# Patient Record
Sex: MALE | Race: WHITE | NOT HISPANIC OR LATINO | Employment: OTHER | URBAN - METROPOLITAN AREA
[De-identification: names, ages, dates, MRNs, and addresses within clinical notes are randomized per-mention and may not be internally consistent; named-entity substitution may affect disease eponyms.]

---

## 2017-04-05 ENCOUNTER — APPOINTMENT (EMERGENCY)
Dept: RADIOLOGY | Facility: HOSPITAL | Age: 82
DRG: 698 | End: 2017-04-05
Payer: MEDICARE

## 2017-04-05 ENCOUNTER — HOSPITAL ENCOUNTER (INPATIENT)
Facility: HOSPITAL | Age: 82
LOS: 6 days | Discharge: NURSING, REHAB OR LONG-TERM CARE | DRG: 698 | End: 2017-04-11
Attending: ANESTHESIOLOGY | Admitting: ANESTHESIOLOGY
Payer: MEDICARE

## 2017-04-05 DIAGNOSIS — A41.9 SEPSIS DUE TO URINARY TRACT INFECTION (HCC): ICD-10-CM

## 2017-04-05 DIAGNOSIS — L89.90 DECUBITUS ULCERS: ICD-10-CM

## 2017-04-05 DIAGNOSIS — A41.9 SEPSIS, UNSPECIFIED: ICD-10-CM

## 2017-04-05 DIAGNOSIS — N39.0 URINARY TRACT INFECTION ASSOCIATED WITH INDWELLING URETHRAL CATHETER (HCC): ICD-10-CM

## 2017-04-05 DIAGNOSIS — Z16.12 ESBL (EXTENDED SPECTRUM BETA-LACTAMASE) PRODUCING BACTERIA INFECTION: ICD-10-CM

## 2017-04-05 DIAGNOSIS — N39.0 SEPSIS DUE TO URINARY TRACT INFECTION (HCC): ICD-10-CM

## 2017-04-05 DIAGNOSIS — A49.9 ESBL (EXTENDED SPECTRUM BETA-LACTAMASE) PRODUCING BACTERIA INFECTION: ICD-10-CM

## 2017-04-05 DIAGNOSIS — N39.0 UTI (URINARY TRACT INFECTION): Primary | ICD-10-CM

## 2017-04-05 DIAGNOSIS — N39.0 URINARY TRACT INFECTION ASSOCIATED WITH NEPHROSTOMY CATHETER, INITIAL ENCOUNTER (HCC): ICD-10-CM

## 2017-04-05 DIAGNOSIS — T83.512A URINARY TRACT INFECTION ASSOCIATED WITH NEPHROSTOMY CATHETER, INITIAL ENCOUNTER (HCC): ICD-10-CM

## 2017-04-05 DIAGNOSIS — R78.81 BACTEREMIA DUE TO GRAM-NEGATIVE BACTERIA: ICD-10-CM

## 2017-04-05 DIAGNOSIS — T83.511A URINARY TRACT INFECTION ASSOCIATED WITH INDWELLING URETHRAL CATHETER (HCC): ICD-10-CM

## 2017-04-05 PROBLEM — L89.322 DECUBITUS ULCER OF LEFT BUTTOCK, STAGE 2 (HCC): Status: ACTIVE | Noted: 2017-04-05

## 2017-04-05 PROBLEM — L89.309 DECUBITUS ULCER OF BUTTOCK: Status: ACTIVE | Noted: 2017-04-05

## 2017-04-05 PROBLEM — L89.600: Status: ACTIVE | Noted: 2017-04-05

## 2017-04-05 PROBLEM — R13.10 DYSPHAGIA: Status: ACTIVE | Noted: 2017-04-05

## 2017-04-05 PROBLEM — I10 HTN (HYPERTENSION): Status: ACTIVE | Noted: 2017-04-05

## 2017-04-05 PROBLEM — E87.2 LACTIC ACIDOSIS: Status: ACTIVE | Noted: 2017-04-05

## 2017-04-05 PROBLEM — L89.620 DECUBITUS ULCER OF LEFT HEEL, UNSTAGEABLE (HCC): Status: ACTIVE | Noted: 2017-04-05

## 2017-04-05 PROBLEM — E11.9 DM (DIABETES MELLITUS) (HCC): Status: ACTIVE | Noted: 2017-04-05

## 2017-04-05 LAB
ALBUMIN SERPL BCP-MCNC: 2.9 G/DL (ref 3.5–5)
ALP SERPL-CCNC: 72 U/L (ref 46–116)
ALT SERPL W P-5'-P-CCNC: 14 U/L (ref 12–78)
ANION GAP SERPL CALCULATED.3IONS-SCNC: 7 MMOL/L (ref 4–13)
AST SERPL W P-5'-P-CCNC: 9 U/L (ref 5–45)
ATRIAL RATE: 63 BPM
BACTERIA UR QL AUTO: ABNORMAL /HPF
BACTERIA UR QL AUTO: ABNORMAL /HPF
BASOPHILS # BLD AUTO: 0.08 THOUSANDS/ΜL (ref 0–0.1)
BASOPHILS NFR BLD AUTO: 0 % (ref 0–1)
BILIRUB SERPL-MCNC: 0.6 MG/DL (ref 0.2–1)
BILIRUB UR QL STRIP: NEGATIVE
BILIRUB UR QL STRIP: NEGATIVE
BUN SERPL-MCNC: 13 MG/DL (ref 5–25)
CALCIUM SERPL-MCNC: 9.7 MG/DL (ref 8.3–10.1)
CHLORIDE SERPL-SCNC: 101 MMOL/L (ref 100–108)
CLARITY UR: ABNORMAL
CLARITY UR: ABNORMAL
CO2 SERPL-SCNC: 27 MMOL/L (ref 21–32)
COLOR UR: YELLOW
COLOR UR: YELLOW
CREAT SERPL-MCNC: 1.01 MG/DL (ref 0.6–1.3)
EOSINOPHIL # BLD AUTO: 0.01 THOUSAND/ΜL (ref 0–0.61)
EOSINOPHIL NFR BLD AUTO: 0 % (ref 0–6)
ERYTHROCYTE [DISTWIDTH] IN BLOOD BY AUTOMATED COUNT: 13.7 % (ref 11.6–15.1)
GFR SERPL CREATININE-BSD FRML MDRD: >60 ML/MIN/1.73SQ M
GLUCOSE SERPL-MCNC: 150 MG/DL (ref 65–140)
GLUCOSE SERPL-MCNC: 179 MG/DL (ref 65–140)
GLUCOSE SERPL-MCNC: 62 MG/DL (ref 65–140)
GLUCOSE UR STRIP-MCNC: NEGATIVE MG/DL
GLUCOSE UR STRIP-MCNC: NEGATIVE MG/DL
HCT VFR BLD AUTO: 43.5 % (ref 36.5–49.3)
HGB BLD-MCNC: 13.9 G/DL (ref 12–17)
HGB UR QL STRIP.AUTO: ABNORMAL
HGB UR QL STRIP.AUTO: ABNORMAL
KETONES UR STRIP-MCNC: NEGATIVE MG/DL
KETONES UR STRIP-MCNC: NEGATIVE MG/DL
LACTATE SERPL-SCNC: 1.9 MMOL/L (ref 0.5–2)
LACTATE SERPL-SCNC: 2.7 MMOL/L (ref 0.5–2)
LEUKOCYTE ESTERASE UR QL STRIP: ABNORMAL
LEUKOCYTE ESTERASE UR QL STRIP: ABNORMAL
LIPASE SERPL-CCNC: 115 U/L (ref 73–393)
LYMPHOCYTES # BLD AUTO: 2.17 THOUSANDS/ΜL (ref 0.6–4.47)
LYMPHOCYTES NFR BLD AUTO: 9 % (ref 14–44)
MCH RBC QN AUTO: 28.7 PG (ref 26.8–34.3)
MCHC RBC AUTO-ENTMCNC: 32 G/DL (ref 31.4–37.4)
MCV RBC AUTO: 90 FL (ref 82–98)
MONOCYTES # BLD AUTO: 2.08 THOUSAND/ΜL (ref 0.17–1.22)
MONOCYTES NFR BLD AUTO: 9 % (ref 4–12)
NEUTROPHILS # BLD AUTO: 19.96 THOUSANDS/ΜL (ref 1.85–7.62)
NEUTS SEG NFR BLD AUTO: 82 % (ref 43–75)
NITRITE UR QL STRIP: NEGATIVE
NITRITE UR QL STRIP: POSITIVE
NON-SQ EPI CELLS URNS QL MICRO: ABNORMAL /HPF
NON-SQ EPI CELLS URNS QL MICRO: ABNORMAL /HPF
NRBC BLD AUTO-RTO: 0 /100 WBCS
NT-PROBNP SERPL-MCNC: 657 PG/ML
P AXIS: -2 DEGREES
PH UR STRIP.AUTO: >=9 [PH] (ref 4.5–8)
PH UR STRIP.AUTO: >=9 [PH] (ref 4.5–8)
PLATELET # BLD AUTO: 358 THOUSANDS/UL (ref 149–390)
PMV BLD AUTO: 10.2 FL (ref 8.9–12.7)
POTASSIUM SERPL-SCNC: 4.3 MMOL/L (ref 3.5–5.3)
PR INTERVAL: 216 MS
PROT SERPL-MCNC: 7.8 G/DL (ref 6.4–8.2)
PROT UR STRIP-MCNC: ABNORMAL MG/DL
PROT UR STRIP-MCNC: ABNORMAL MG/DL
QRS AXIS: -36 DEGREES
QRSD INTERVAL: 98 MS
QT INTERVAL: 392 MS
QTC INTERVAL: 401 MS
RBC # BLD AUTO: 4.84 MILLION/UL (ref 3.88–5.62)
RBC #/AREA URNS AUTO: ABNORMAL /HPF
RBC #/AREA URNS AUTO: ABNORMAL /HPF
SODIUM SERPL-SCNC: 135 MMOL/L (ref 136–145)
SP GR UR STRIP.AUTO: <=1.005 (ref 1–1.03)
SP GR UR STRIP.AUTO: <=1.005 (ref 1–1.03)
T WAVE AXIS: 70 DEGREES
TROPONIN I SERPL-MCNC: <0.02 NG/ML
TROPONIN I SERPL-MCNC: <0.02 NG/ML
UROBILINOGEN UR QL STRIP.AUTO: 1 E.U./DL
UROBILINOGEN UR QL STRIP.AUTO: 1 E.U./DL
VENTRICULAR RATE: 63 BPM
WBC # BLD AUTO: 24.5 THOUSAND/UL (ref 4.31–10.16)
WBC #/AREA URNS AUTO: ABNORMAL /HPF
WBC #/AREA URNS AUTO: ABNORMAL /HPF

## 2017-04-05 PROCEDURE — 80053 COMPREHEN METABOLIC PANEL: CPT | Performed by: PHYSICIAN ASSISTANT

## 2017-04-05 PROCEDURE — 87077 CULTURE AEROBIC IDENTIFY: CPT | Performed by: ANESTHESIOLOGY

## 2017-04-05 PROCEDURE — 71020 HB CHEST X-RAY 2VW FRONTAL&LATL: CPT

## 2017-04-05 PROCEDURE — 83605 ASSAY OF LACTIC ACID: CPT | Performed by: ANESTHESIOLOGY

## 2017-04-05 PROCEDURE — 93005 ELECTROCARDIOGRAM TRACING: CPT | Performed by: PHYSICIAN ASSISTANT

## 2017-04-05 PROCEDURE — 87040 BLOOD CULTURE FOR BACTERIA: CPT | Performed by: PHYSICIAN ASSISTANT

## 2017-04-05 PROCEDURE — 83690 ASSAY OF LIPASE: CPT | Performed by: PHYSICIAN ASSISTANT

## 2017-04-05 PROCEDURE — 83880 ASSAY OF NATRIURETIC PEPTIDE: CPT | Performed by: PHYSICIAN ASSISTANT

## 2017-04-05 PROCEDURE — 84484 ASSAY OF TROPONIN QUANT: CPT | Performed by: NURSE PRACTITIONER

## 2017-04-05 PROCEDURE — 99285 EMERGENCY DEPT VISIT HI MDM: CPT

## 2017-04-05 PROCEDURE — 96361 HYDRATE IV INFUSION ADD-ON: CPT

## 2017-04-05 PROCEDURE — 93005 ELECTROCARDIOGRAM TRACING: CPT

## 2017-04-05 PROCEDURE — 87077 CULTURE AEROBIC IDENTIFY: CPT | Performed by: PHYSICIAN ASSISTANT

## 2017-04-05 PROCEDURE — 83605 ASSAY OF LACTIC ACID: CPT | Performed by: PHYSICIAN ASSISTANT

## 2017-04-05 PROCEDURE — 87186 SC STD MICRODIL/AGAR DIL: CPT | Performed by: PHYSICIAN ASSISTANT

## 2017-04-05 PROCEDURE — 87086 URINE CULTURE/COLONY COUNT: CPT

## 2017-04-05 PROCEDURE — 82948 REAGENT STRIP/BLOOD GLUCOSE: CPT

## 2017-04-05 PROCEDURE — 36415 COLL VENOUS BLD VENIPUNCTURE: CPT | Performed by: PHYSICIAN ASSISTANT

## 2017-04-05 PROCEDURE — 81001 URINALYSIS AUTO W/SCOPE: CPT

## 2017-04-05 PROCEDURE — 87086 URINE CULTURE/COLONY COUNT: CPT | Performed by: PHYSICIAN ASSISTANT

## 2017-04-05 PROCEDURE — 81001 URINALYSIS AUTO W/SCOPE: CPT | Performed by: PHYSICIAN ASSISTANT

## 2017-04-05 PROCEDURE — 85025 COMPLETE CBC W/AUTO DIFF WBC: CPT | Performed by: PHYSICIAN ASSISTANT

## 2017-04-05 PROCEDURE — 84484 ASSAY OF TROPONIN QUANT: CPT | Performed by: PHYSICIAN ASSISTANT

## 2017-04-05 PROCEDURE — 94760 N-INVAS EAR/PLS OXIMETRY 1: CPT

## 2017-04-05 PROCEDURE — 96374 THER/PROPH/DIAG INJ IV PUSH: CPT

## 2017-04-05 RX ORDER — FUROSEMIDE 40 MG/1
40 TABLET ORAL
COMMUNITY

## 2017-04-05 RX ORDER — HEPARIN SODIUM 5000 [USP'U]/ML
5000 INJECTION, SOLUTION INTRAVENOUS; SUBCUTANEOUS EVERY 8 HOURS SCHEDULED
Status: DISCONTINUED | OUTPATIENT
Start: 2017-04-05 | End: 2017-04-11 | Stop reason: HOSPADM

## 2017-04-05 RX ORDER — METOPROLOL TARTRATE 50 MG/1
50 TABLET, FILM COATED ORAL
COMMUNITY

## 2017-04-05 RX ORDER — SODIUM CHLORIDE 9 MG/ML
125 INJECTION, SOLUTION INTRAVENOUS CONTINUOUS
Status: DISCONTINUED | OUTPATIENT
Start: 2017-04-05 | End: 2017-04-06

## 2017-04-05 RX ORDER — SODIUM CHLORIDE 9 MG/ML
125 INJECTION, SOLUTION INTRAVENOUS CONTINUOUS
Status: DISCONTINUED | OUTPATIENT
Start: 2017-04-05 | End: 2017-04-05 | Stop reason: SDUPTHER

## 2017-04-05 RX ORDER — TAMSULOSIN HYDROCHLORIDE 0.4 MG/1
0.4 CAPSULE ORAL
COMMUNITY

## 2017-04-05 RX ORDER — ACETAMINOPHEN 325 MG/1
650 TABLET ORAL ONCE
Status: DISCONTINUED | OUTPATIENT
Start: 2017-04-05 | End: 2017-04-11 | Stop reason: HOSPADM

## 2017-04-05 RX ORDER — PANTOPRAZOLE SODIUM 40 MG/1
40 INJECTION, POWDER, FOR SOLUTION INTRAVENOUS
Status: DISCONTINUED | OUTPATIENT
Start: 2017-04-06 | End: 2017-04-11 | Stop reason: HOSPADM

## 2017-04-05 RX ORDER — DILTIAZEM HYDROCHLORIDE 120 MG/1
120 TABLET, FILM COATED ORAL
COMMUNITY
End: 2017-12-22 | Stop reason: HOSPADM

## 2017-04-05 RX ORDER — ATORVASTATIN CALCIUM 40 MG/1
40 TABLET, FILM COATED ORAL
COMMUNITY

## 2017-04-05 RX ORDER — ACETAMINOPHEN 650 MG/1
SUPPOSITORY RECTAL
Status: COMPLETED
Start: 2017-04-05 | End: 2017-04-05

## 2017-04-05 RX ORDER — SPIRONOLACTONE 50 MG/1
50 TABLET, FILM COATED ORAL
COMMUNITY

## 2017-04-05 RX ADMIN — SODIUM CHLORIDE 1000 ML: 0.9 INJECTION, SOLUTION INTRAVENOUS at 20:13

## 2017-04-05 RX ADMIN — HEPARIN SODIUM 5000 UNITS: 5000 INJECTION, SOLUTION INTRAVENOUS; SUBCUTANEOUS at 22:50

## 2017-04-05 RX ADMIN — ACETAMINOPHEN: 650 SUPPOSITORY RECTAL at 16:55

## 2017-04-05 RX ADMIN — CEFEPIME 2000 MG: 2 INJECTION, POWDER, FOR SOLUTION INTRAVENOUS at 18:37

## 2017-04-05 RX ADMIN — SODIUM CHLORIDE 125 ML/HR: 0.9 INJECTION, SOLUTION INTRAVENOUS at 21:39

## 2017-04-05 RX ADMIN — SODIUM CHLORIDE 1000 ML: 0.9 INJECTION, SOLUTION INTRAVENOUS at 18:01

## 2017-04-05 RX ADMIN — SODIUM CHLORIDE 500 ML: 9 INJECTION, SOLUTION INTRAVENOUS at 16:03

## 2017-04-06 PROBLEM — N39.0 URINARY TRACT INFECTION ASSOCIATED WITH NEPHROSTOMY CATHETER (HCC): Status: ACTIVE | Noted: 2017-04-06

## 2017-04-06 PROBLEM — T83.512A URINARY TRACT INFECTION ASSOCIATED WITH NEPHROSTOMY CATHETER (HCC): Status: ACTIVE | Noted: 2017-04-06

## 2017-04-06 PROBLEM — E87.2 LACTIC ACIDOSIS: Status: RESOLVED | Noted: 2017-04-05 | Resolved: 2017-04-06

## 2017-04-06 PROBLEM — T83.511A URINARY TRACT INFECTION ASSOCIATED WITH INDWELLING URETHRAL CATHETER (HCC): Status: ACTIVE | Noted: 2017-04-05

## 2017-04-06 PROBLEM — N39.0 SEPSIS DUE TO URINARY TRACT INFECTION (HCC): Status: ACTIVE | Noted: 2017-04-05

## 2017-04-06 PROBLEM — E83.39 HYPOPHOSPHATEMIA: Status: ACTIVE | Noted: 2017-04-06

## 2017-04-06 PROBLEM — R78.81 BACTEREMIA DUE TO GRAM-NEGATIVE BACTERIA: Status: ACTIVE | Noted: 2017-04-06

## 2017-04-06 LAB
ANION GAP SERPL CALCULATED.3IONS-SCNC: 9 MMOL/L (ref 4–13)
BASOPHILS # BLD AUTO: 0.06 THOUSANDS/ΜL (ref 0–0.1)
BASOPHILS NFR BLD AUTO: 0 % (ref 0–1)
BUN SERPL-MCNC: 14 MG/DL (ref 5–25)
CA-I BLD-SCNC: 1.09 MMOL/L (ref 1.12–1.32)
CALCIUM SERPL-MCNC: 8.6 MG/DL (ref 8.3–10.1)
CHLORIDE SERPL-SCNC: 105 MMOL/L (ref 100–108)
CO2 SERPL-SCNC: 22 MMOL/L (ref 21–32)
CREAT SERPL-MCNC: 0.73 MG/DL (ref 0.6–1.3)
EOSINOPHIL # BLD AUTO: 0.01 THOUSAND/ΜL (ref 0–0.61)
EOSINOPHIL NFR BLD AUTO: 0 % (ref 0–6)
ERYTHROCYTE [DISTWIDTH] IN BLOOD BY AUTOMATED COUNT: 14.1 % (ref 11.6–15.1)
GFR SERPL CREATININE-BSD FRML MDRD: >60 ML/MIN/1.73SQ M
GLUCOSE SERPL-MCNC: 151 MG/DL (ref 65–140)
GLUCOSE SERPL-MCNC: 153 MG/DL (ref 65–140)
GLUCOSE SERPL-MCNC: 167 MG/DL (ref 65–140)
GLUCOSE SERPL-MCNC: 196 MG/DL (ref 65–140)
GLUCOSE SERPL-MCNC: 200 MG/DL (ref 65–140)
HCT VFR BLD AUTO: 34.3 % (ref 36.5–49.3)
HGB BLD-MCNC: 11 G/DL (ref 12–17)
LYMPHOCYTES # BLD AUTO: 1.77 THOUSANDS/ΜL (ref 0.6–4.47)
LYMPHOCYTES NFR BLD AUTO: 9 % (ref 14–44)
MAGNESIUM SERPL-MCNC: 1.8 MG/DL (ref 1.6–2.6)
MCH RBC QN AUTO: 29.1 PG (ref 26.8–34.3)
MCHC RBC AUTO-ENTMCNC: 32.1 G/DL (ref 31.4–37.4)
MCV RBC AUTO: 91 FL (ref 82–98)
MONOCYTES # BLD AUTO: 1.69 THOUSAND/ΜL (ref 0.17–1.22)
MONOCYTES NFR BLD AUTO: 8 % (ref 4–12)
NEUTROPHILS # BLD AUTO: 16.93 THOUSANDS/ΜL (ref 1.85–7.62)
NEUTS SEG NFR BLD AUTO: 82 % (ref 43–75)
NRBC BLD AUTO-RTO: 0 /100 WBCS
PHOSPHATE SERPL-MCNC: 2.8 MG/DL (ref 2.3–4.1)
PLATELET # BLD AUTO: 266 THOUSANDS/UL (ref 149–390)
PMV BLD AUTO: 10 FL (ref 8.9–12.7)
POTASSIUM SERPL-SCNC: 3.9 MMOL/L (ref 3.5–5.3)
RBC # BLD AUTO: 3.78 MILLION/UL (ref 3.88–5.62)
SODIUM SERPL-SCNC: 136 MMOL/L (ref 136–145)
TROPONIN I SERPL-MCNC: <0.02 NG/ML
WBC # BLD AUTO: 20.61 THOUSAND/UL (ref 4.31–10.16)

## 2017-04-06 PROCEDURE — 84484 ASSAY OF TROPONIN QUANT: CPT | Performed by: NURSE PRACTITIONER

## 2017-04-06 PROCEDURE — 82330 ASSAY OF CALCIUM: CPT | Performed by: NURSE PRACTITIONER

## 2017-04-06 PROCEDURE — 87081 CULTURE SCREEN ONLY: CPT | Performed by: INTERNAL MEDICINE

## 2017-04-06 PROCEDURE — C9113 INJ PANTOPRAZOLE SODIUM, VIA: HCPCS | Performed by: NURSE PRACTITIONER

## 2017-04-06 PROCEDURE — 92610 EVALUATE SWALLOWING FUNCTION: CPT

## 2017-04-06 PROCEDURE — 83735 ASSAY OF MAGNESIUM: CPT | Performed by: NURSE PRACTITIONER

## 2017-04-06 PROCEDURE — 87040 BLOOD CULTURE FOR BACTERIA: CPT | Performed by: INTERNAL MEDICINE

## 2017-04-06 PROCEDURE — 82948 REAGENT STRIP/BLOOD GLUCOSE: CPT

## 2017-04-06 PROCEDURE — 85025 COMPLETE CBC W/AUTO DIFF WBC: CPT | Performed by: NURSE PRACTITIONER

## 2017-04-06 PROCEDURE — 84100 ASSAY OF PHOSPHORUS: CPT | Performed by: NURSE PRACTITIONER

## 2017-04-06 PROCEDURE — 80048 BASIC METABOLIC PNL TOTAL CA: CPT | Performed by: PHYSICIAN ASSISTANT

## 2017-04-06 RX ORDER — SODIUM CHLORIDE, SODIUM GLUCONATE, SODIUM ACETATE, POTASSIUM CHLORIDE, MAGNESIUM CHLORIDE, SODIUM PHOSPHATE, DIBASIC, AND POTASSIUM PHOSPHATE .53; .5; .37; .037; .03; .012; .00082 G/100ML; G/100ML; G/100ML; G/100ML; G/100ML; G/100ML; G/100ML
75 INJECTION, SOLUTION INTRAVENOUS CONTINUOUS
Status: DISCONTINUED | OUTPATIENT
Start: 2017-04-06 | End: 2017-04-08

## 2017-04-06 RX ORDER — ACETAMINOPHEN 325 MG/1
650 TABLET ORAL EVERY 6 HOURS PRN
Status: DISCONTINUED | OUTPATIENT
Start: 2017-04-06 | End: 2017-04-11 | Stop reason: HOSPADM

## 2017-04-06 RX ADMIN — HEPARIN SODIUM 5000 UNITS: 5000 INJECTION, SOLUTION INTRAVENOUS; SUBCUTANEOUS at 06:16

## 2017-04-06 RX ADMIN — VANCOMYCIN HYDROCHLORIDE 1250 MG: 1 INJECTION, POWDER, LYOPHILIZED, FOR SOLUTION INTRAVENOUS at 11:30

## 2017-04-06 RX ADMIN — HEPARIN SODIUM 5000 UNITS: 5000 INJECTION, SOLUTION INTRAVENOUS; SUBCUTANEOUS at 16:07

## 2017-04-06 RX ADMIN — PANTOPRAZOLE SODIUM 40 MG: 40 INJECTION, POWDER, FOR SOLUTION INTRAVENOUS at 08:23

## 2017-04-06 RX ADMIN — HEPARIN SODIUM 5000 UNITS: 5000 INJECTION, SOLUTION INTRAVENOUS; SUBCUTANEOUS at 22:00

## 2017-04-06 RX ADMIN — SODIUM CHLORIDE, SODIUM GLUCONATE, SODIUM ACETATE, POTASSIUM CHLORIDE, MAGNESIUM CHLORIDE, SODIUM PHOSPHATE, DIBASIC, AND POTASSIUM PHOSPHATE 75 ML/HR: .53; .5; .37; .037; .03; .012; .00082 INJECTION, SOLUTION INTRAVENOUS at 10:45

## 2017-04-06 RX ADMIN — CEFEPIME 2000 MG: 2 INJECTION, POWDER, FOR SOLUTION INTRAVENOUS at 18:19

## 2017-04-06 RX ADMIN — SODIUM CHLORIDE 125 ML/HR: 0.9 INJECTION, SOLUTION INTRAVENOUS at 05:50

## 2017-04-06 RX ADMIN — INSULIN LISPRO 1 UNITS: 100 INJECTION, SOLUTION INTRAVENOUS; SUBCUTANEOUS at 00:47

## 2017-04-06 RX ADMIN — CEFEPIME 2000 MG: 2 INJECTION, POWDER, FOR SOLUTION INTRAVENOUS at 06:12

## 2017-04-06 RX ADMIN — DIBASIC SODIUM PHOSPHATE, MONOBASIC POTASSIUM PHOSPHATE AND MONOBASIC SODIUM PHOSPHATE 1 TABLET: 852; 155; 130 TABLET ORAL at 22:00

## 2017-04-06 RX ADMIN — INSULIN LISPRO 1 UNITS: 100 INJECTION, SOLUTION INTRAVENOUS; SUBCUTANEOUS at 06:05

## 2017-04-06 RX ADMIN — VANCOMYCIN HYDROCHLORIDE 1250 MG: 1 INJECTION, POWDER, LYOPHILIZED, FOR SOLUTION INTRAVENOUS at 23:06

## 2017-04-06 RX ADMIN — DIBASIC SODIUM PHOSPHATE, MONOBASIC POTASSIUM PHOSPHATE AND MONOBASIC SODIUM PHOSPHATE 1 TABLET: 852; 155; 130 TABLET ORAL at 16:31

## 2017-04-07 LAB
ANION GAP SERPL CALCULATED.3IONS-SCNC: 11 MMOL/L (ref 4–13)
BASOPHILS # BLD AUTO: 0.05 THOUSANDS/ΜL (ref 0–0.1)
BASOPHILS NFR BLD AUTO: 0 % (ref 0–1)
BUN SERPL-MCNC: 11 MG/DL (ref 5–25)
C DIFF TOX GENS STL QL NAA+PROBE: NORMAL
CALCIUM SERPL-MCNC: 8.7 MG/DL (ref 8.3–10.1)
CHLORIDE SERPL-SCNC: 105 MMOL/L (ref 100–108)
CO2 SERPL-SCNC: 23 MMOL/L (ref 21–32)
CREAT SERPL-MCNC: 0.71 MG/DL (ref 0.6–1.3)
EOSINOPHIL # BLD AUTO: 0.12 THOUSAND/ΜL (ref 0–0.61)
EOSINOPHIL NFR BLD AUTO: 1 % (ref 0–6)
ERYTHROCYTE [DISTWIDTH] IN BLOOD BY AUTOMATED COUNT: 13.9 % (ref 11.6–15.1)
GFR SERPL CREATININE-BSD FRML MDRD: >60 ML/MIN/1.73SQ M
GLUCOSE SERPL-MCNC: 123 MG/DL (ref 65–140)
GLUCOSE SERPL-MCNC: 138 MG/DL (ref 65–140)
GLUCOSE SERPL-MCNC: 183 MG/DL (ref 65–140)
GLUCOSE SERPL-MCNC: 185 MG/DL (ref 65–140)
GLUCOSE SERPL-MCNC: 192 MG/DL (ref 65–140)
HCT VFR BLD AUTO: 34.6 % (ref 36.5–49.3)
HGB BLD-MCNC: 10.9 G/DL (ref 12–17)
LYMPHOCYTES # BLD AUTO: 2.29 THOUSANDS/ΜL (ref 0.6–4.47)
LYMPHOCYTES NFR BLD AUTO: 16 % (ref 14–44)
MAGNESIUM SERPL-MCNC: 2 MG/DL (ref 1.6–2.6)
MCH RBC QN AUTO: 28.5 PG (ref 26.8–34.3)
MCHC RBC AUTO-ENTMCNC: 31.5 G/DL (ref 31.4–37.4)
MCV RBC AUTO: 90 FL (ref 82–98)
MONOCYTES # BLD AUTO: 1.4 THOUSAND/ΜL (ref 0.17–1.22)
MONOCYTES NFR BLD AUTO: 10 % (ref 4–12)
MRSA NOSE QL CULT: NORMAL
NEUTROPHILS # BLD AUTO: 10.46 THOUSANDS/ΜL (ref 1.85–7.62)
NEUTS SEG NFR BLD AUTO: 73 % (ref 43–75)
NRBC BLD AUTO-RTO: 0 /100 WBCS
PHOSPHATE SERPL-MCNC: 2.3 MG/DL (ref 2.3–4.1)
PLATELET # BLD AUTO: 266 THOUSANDS/UL (ref 149–390)
PMV BLD AUTO: 11.1 FL (ref 8.9–12.7)
POTASSIUM SERPL-SCNC: 3.2 MMOL/L (ref 3.5–5.3)
RBC # BLD AUTO: 3.83 MILLION/UL (ref 3.88–5.62)
SODIUM SERPL-SCNC: 139 MMOL/L (ref 136–145)
WBC # BLD AUTO: 14.43 THOUSAND/UL (ref 4.31–10.16)

## 2017-04-07 PROCEDURE — 83735 ASSAY OF MAGNESIUM: CPT | Performed by: INTERNAL MEDICINE

## 2017-04-07 PROCEDURE — C9113 INJ PANTOPRAZOLE SODIUM, VIA: HCPCS | Performed by: NURSE PRACTITIONER

## 2017-04-07 PROCEDURE — 94668 MNPJ CHEST WALL SBSQ: CPT

## 2017-04-07 PROCEDURE — 87493 C DIFF AMPLIFIED PROBE: CPT | Performed by: INTERNAL MEDICINE

## 2017-04-07 PROCEDURE — 85025 COMPLETE CBC W/AUTO DIFF WBC: CPT | Performed by: INTERNAL MEDICINE

## 2017-04-07 PROCEDURE — 84100 ASSAY OF PHOSPHORUS: CPT | Performed by: INTERNAL MEDICINE

## 2017-04-07 PROCEDURE — 80048 BASIC METABOLIC PNL TOTAL CA: CPT | Performed by: INTERNAL MEDICINE

## 2017-04-07 PROCEDURE — 94760 N-INVAS EAR/PLS OXIMETRY 1: CPT

## 2017-04-07 PROCEDURE — 82948 REAGENT STRIP/BLOOD GLUCOSE: CPT

## 2017-04-07 PROCEDURE — 92526 ORAL FUNCTION THERAPY: CPT

## 2017-04-07 RX ORDER — TAMSULOSIN HYDROCHLORIDE 0.4 MG/1
0.4 CAPSULE ORAL
Status: DISCONTINUED | OUTPATIENT
Start: 2017-04-07 | End: 2017-04-11 | Stop reason: HOSPADM

## 2017-04-07 RX ADMIN — CEFEPIME 2000 MG: 2 INJECTION, POWDER, FOR SOLUTION INTRAVENOUS at 05:38

## 2017-04-07 RX ADMIN — TAMSULOSIN HYDROCHLORIDE 0.4 MG: 0.4 CAPSULE ORAL at 17:06

## 2017-04-07 RX ADMIN — HEPARIN SODIUM 5000 UNITS: 5000 INJECTION, SOLUTION INTRAVENOUS; SUBCUTANEOUS at 05:37

## 2017-04-07 RX ADMIN — DIBASIC SODIUM PHOSPHATE, MONOBASIC POTASSIUM PHOSPHATE AND MONOBASIC SODIUM PHOSPHATE 1 TABLET: 852; 155; 130 TABLET ORAL at 17:06

## 2017-04-07 RX ADMIN — SODIUM CHLORIDE, SODIUM GLUCONATE, SODIUM ACETATE, POTASSIUM CHLORIDE, MAGNESIUM CHLORIDE, SODIUM PHOSPHATE, DIBASIC, AND POTASSIUM PHOSPHATE 125 ML/HR: .53; .5; .37; .037; .03; .012; .00082 INJECTION, SOLUTION INTRAVENOUS at 05:38

## 2017-04-07 RX ADMIN — PANTOPRAZOLE SODIUM 40 MG: 40 INJECTION, POWDER, FOR SOLUTION INTRAVENOUS at 10:00

## 2017-04-07 RX ADMIN — METOPROLOL TARTRATE 12.5 MG: 25 TABLET ORAL at 21:46

## 2017-04-07 RX ADMIN — HEPARIN SODIUM 5000 UNITS: 5000 INJECTION, SOLUTION INTRAVENOUS; SUBCUTANEOUS at 13:54

## 2017-04-07 RX ADMIN — SODIUM CHLORIDE, SODIUM GLUCONATE, SODIUM ACETATE, POTASSIUM CHLORIDE, MAGNESIUM CHLORIDE, SODIUM PHOSPHATE, DIBASIC, AND POTASSIUM PHOSPHATE 125 ML/HR: .53; .5; .37; .037; .03; .012; .00082 INJECTION, SOLUTION INTRAVENOUS at 17:10

## 2017-04-07 RX ADMIN — DIBASIC SODIUM PHOSPHATE, MONOBASIC POTASSIUM PHOSPHATE AND MONOBASIC SODIUM PHOSPHATE 1 TABLET: 852; 155; 130 TABLET ORAL at 10:00

## 2017-04-07 RX ADMIN — HEPARIN SODIUM 5000 UNITS: 5000 INJECTION, SOLUTION INTRAVENOUS; SUBCUTANEOUS at 21:46

## 2017-04-07 RX ADMIN — METOPROLOL TARTRATE 12.5 MG: 25 TABLET ORAL at 13:54

## 2017-04-07 RX ADMIN — CEFEPIME 2000 MG: 2 INJECTION, POWDER, FOR SOLUTION INTRAVENOUS at 18:18

## 2017-04-07 RX ADMIN — DIBASIC SODIUM PHOSPHATE, MONOBASIC POTASSIUM PHOSPHATE AND MONOBASIC SODIUM PHOSPHATE 1 TABLET: 852; 155; 130 TABLET ORAL at 13:55

## 2017-04-07 RX ADMIN — VANCOMYCIN HYDROCHLORIDE 1250 MG: 1 INJECTION, POWDER, LYOPHILIZED, FOR SOLUTION INTRAVENOUS at 21:46

## 2017-04-07 RX ADMIN — DIBASIC SODIUM PHOSPHATE, MONOBASIC POTASSIUM PHOSPHATE AND MONOBASIC SODIUM PHOSPHATE 1 TABLET: 852; 155; 130 TABLET ORAL at 21:46

## 2017-04-07 RX ADMIN — VANCOMYCIN HYDROCHLORIDE 1250 MG: 1 INJECTION, POWDER, LYOPHILIZED, FOR SOLUTION INTRAVENOUS at 10:06

## 2017-04-08 ENCOUNTER — APPOINTMENT (INPATIENT)
Dept: CT IMAGING | Facility: HOSPITAL | Age: 82
DRG: 698 | End: 2017-04-08
Payer: MEDICARE

## 2017-04-08 PROBLEM — E87.6 HYPOKALEMIA: Status: ACTIVE | Noted: 2017-04-08

## 2017-04-08 LAB
ANION GAP SERPL CALCULATED.3IONS-SCNC: 9 MMOL/L (ref 4–13)
BACTERIA BLD CULT: ABNORMAL
BACTERIA UR QL AUTO: ABNORMAL /HPF
BASOPHILS # BLD AUTO: 0.05 THOUSANDS/ΜL (ref 0–0.1)
BASOPHILS NFR BLD AUTO: 0 % (ref 0–1)
BILIRUB UR QL STRIP: NEGATIVE
BUN SERPL-MCNC: 7 MG/DL (ref 5–25)
CALCIUM SERPL-MCNC: 8.4 MG/DL (ref 8.3–10.1)
CHLORIDE SERPL-SCNC: 106 MMOL/L (ref 100–108)
CLARITY UR: CLEAR
CO2 SERPL-SCNC: 26 MMOL/L (ref 21–32)
COLOR UR: YELLOW
CREAT SERPL-MCNC: 0.62 MG/DL (ref 0.6–1.3)
EOSINOPHIL # BLD AUTO: 0.34 THOUSAND/ΜL (ref 0–0.61)
EOSINOPHIL NFR BLD AUTO: 3 % (ref 0–6)
ERYTHROCYTE [DISTWIDTH] IN BLOOD BY AUTOMATED COUNT: 13.9 % (ref 11.6–15.1)
GFR SERPL CREATININE-BSD FRML MDRD: >60 ML/MIN/1.73SQ M
GLUCOSE SERPL-MCNC: 109 MG/DL (ref 65–140)
GLUCOSE SERPL-MCNC: 120 MG/DL (ref 65–140)
GLUCOSE SERPL-MCNC: 149 MG/DL (ref 65–140)
GLUCOSE SERPL-MCNC: 193 MG/DL (ref 65–140)
GLUCOSE SERPL-MCNC: 248 MG/DL (ref 65–140)
GLUCOSE UR STRIP-MCNC: NEGATIVE MG/DL
GRAM STN SPEC: ABNORMAL
HCT VFR BLD AUTO: 34.1 % (ref 36.5–49.3)
HGB BLD-MCNC: 10.6 G/DL (ref 12–17)
HGB UR QL STRIP.AUTO: ABNORMAL
KETONES UR STRIP-MCNC: NEGATIVE MG/DL
LEUKOCYTE ESTERASE UR QL STRIP: ABNORMAL
LYMPHOCYTES # BLD AUTO: 2.48 THOUSANDS/ΜL (ref 0.6–4.47)
LYMPHOCYTES NFR BLD AUTO: 21 % (ref 14–44)
MAGNESIUM SERPL-MCNC: 2.1 MG/DL (ref 1.6–2.6)
MCH RBC QN AUTO: 28.4 PG (ref 26.8–34.3)
MCHC RBC AUTO-ENTMCNC: 31.1 G/DL (ref 31.4–37.4)
MCV RBC AUTO: 91 FL (ref 82–98)
MONOCYTES # BLD AUTO: 1.17 THOUSAND/ΜL (ref 0.17–1.22)
MONOCYTES NFR BLD AUTO: 10 % (ref 4–12)
NEUTROPHILS # BLD AUTO: 7.61 THOUSANDS/ΜL (ref 1.85–7.62)
NEUTS SEG NFR BLD AUTO: 65 % (ref 43–75)
NITRITE UR QL STRIP: NEGATIVE
NON-SQ EPI CELLS URNS QL MICRO: ABNORMAL /HPF
NRBC BLD AUTO-RTO: 0 /100 WBCS
PH UR STRIP.AUTO: 7 [PH] (ref 4.5–8)
PHOSPHATE SERPL-MCNC: 2.4 MG/DL (ref 2.3–4.1)
PLATELET # BLD AUTO: 268 THOUSANDS/UL (ref 149–390)
PMV BLD AUTO: 10.9 FL (ref 8.9–12.7)
POTASSIUM SERPL-SCNC: 3.1 MMOL/L (ref 3.5–5.3)
PROT UR STRIP-MCNC: ABNORMAL MG/DL
RBC # BLD AUTO: 3.73 MILLION/UL (ref 3.88–5.62)
RBC #/AREA URNS AUTO: ABNORMAL /HPF
SODIUM SERPL-SCNC: 141 MMOL/L (ref 136–145)
SP GR UR STRIP.AUTO: 1.01 (ref 1–1.03)
UROBILINOGEN UR QL STRIP.AUTO: 1 E.U./DL
WBC # BLD AUTO: 11.74 THOUSAND/UL (ref 4.31–10.16)
WBC #/AREA URNS AUTO: ABNORMAL /HPF

## 2017-04-08 PROCEDURE — 82948 REAGENT STRIP/BLOOD GLUCOSE: CPT

## 2017-04-08 PROCEDURE — 87086 URINE CULTURE/COLONY COUNT: CPT | Performed by: INTERNAL MEDICINE

## 2017-04-08 PROCEDURE — 84100 ASSAY OF PHOSPHORUS: CPT | Performed by: INTERNAL MEDICINE

## 2017-04-08 PROCEDURE — 97162 PT EVAL MOD COMPLEX 30 MIN: CPT

## 2017-04-08 PROCEDURE — 83735 ASSAY OF MAGNESIUM: CPT | Performed by: INTERNAL MEDICINE

## 2017-04-08 PROCEDURE — 74177 CT ABD & PELVIS W/CONTRAST: CPT

## 2017-04-08 PROCEDURE — 94760 N-INVAS EAR/PLS OXIMETRY 1: CPT

## 2017-04-08 PROCEDURE — G8978 MOBILITY CURRENT STATUS: HCPCS

## 2017-04-08 PROCEDURE — G8979 MOBILITY GOAL STATUS: HCPCS

## 2017-04-08 PROCEDURE — 85025 COMPLETE CBC W/AUTO DIFF WBC: CPT | Performed by: INTERNAL MEDICINE

## 2017-04-08 PROCEDURE — 81001 URINALYSIS AUTO W/SCOPE: CPT | Performed by: INTERNAL MEDICINE

## 2017-04-08 PROCEDURE — 94668 MNPJ CHEST WALL SBSQ: CPT

## 2017-04-08 PROCEDURE — C9113 INJ PANTOPRAZOLE SODIUM, VIA: HCPCS | Performed by: NURSE PRACTITIONER

## 2017-04-08 PROCEDURE — 97110 THERAPEUTIC EXERCISES: CPT

## 2017-04-08 PROCEDURE — 71260 CT THORAX DX C+: CPT

## 2017-04-08 PROCEDURE — 80048 BASIC METABOLIC PNL TOTAL CA: CPT | Performed by: INTERNAL MEDICINE

## 2017-04-08 RX ORDER — POTASSIUM CHLORIDE 20 MEQ/1
40 TABLET, EXTENDED RELEASE ORAL EVERY 4 HOURS
Status: COMPLETED | OUTPATIENT
Start: 2017-04-08 | End: 2017-04-08

## 2017-04-08 RX ORDER — SACCHAROMYCES BOULARDII 250 MG
250 CAPSULE ORAL 2 TIMES DAILY
Status: DISCONTINUED | OUTPATIENT
Start: 2017-04-08 | End: 2017-04-11 | Stop reason: HOSPADM

## 2017-04-08 RX ORDER — ERTAPENEM 1 G/1
1000 INJECTION, POWDER, LYOPHILIZED, FOR SOLUTION INTRAMUSCULAR; INTRAVENOUS EVERY 24 HOURS
Status: DISCONTINUED | OUTPATIENT
Start: 2017-04-08 | End: 2017-04-08 | Stop reason: SDUPTHER

## 2017-04-08 RX ORDER — SODIUM CHLORIDE 9 MG/ML
75 INJECTION, SOLUTION INTRAVENOUS CONTINUOUS
Status: DISCONTINUED | OUTPATIENT
Start: 2017-04-08 | End: 2017-04-11

## 2017-04-08 RX ADMIN — METOPROLOL TARTRATE 12.5 MG: 25 TABLET ORAL at 09:14

## 2017-04-08 RX ADMIN — POTASSIUM CHLORIDE 40 MEQ: 1500 TABLET, EXTENDED RELEASE ORAL at 09:14

## 2017-04-08 RX ADMIN — SODIUM CHLORIDE 1000 MG: 9 INJECTION, SOLUTION INTRAVENOUS at 14:21

## 2017-04-08 RX ADMIN — Medication 250 MG: at 12:48

## 2017-04-08 RX ADMIN — DIBASIC SODIUM PHOSPHATE, MONOBASIC POTASSIUM PHOSPHATE AND MONOBASIC SODIUM PHOSPHATE 1 TABLET: 852; 155; 130 TABLET ORAL at 09:14

## 2017-04-08 RX ADMIN — TAMSULOSIN HYDROCHLORIDE 0.4 MG: 0.4 CAPSULE ORAL at 18:01

## 2017-04-08 RX ADMIN — PIPERACILLIN SODIUM,TAZOBACTAM SODIUM 3.38 G: 3; .375 INJECTION, POWDER, FOR SOLUTION INTRAVENOUS at 12:14

## 2017-04-08 RX ADMIN — Medication 250 MG: at 18:01

## 2017-04-08 RX ADMIN — POTASSIUM CHLORIDE 40 MEQ: 1500 TABLET, EXTENDED RELEASE ORAL at 12:46

## 2017-04-08 RX ADMIN — DIBASIC SODIUM PHOSPHATE, MONOBASIC POTASSIUM PHOSPHATE AND MONOBASIC SODIUM PHOSPHATE 1 TABLET: 852; 155; 130 TABLET ORAL at 12:46

## 2017-04-08 RX ADMIN — METOPROLOL TARTRATE 12.5 MG: 25 TABLET ORAL at 20:22

## 2017-04-08 RX ADMIN — HEPARIN SODIUM 5000 UNITS: 5000 INJECTION, SOLUTION INTRAVENOUS; SUBCUTANEOUS at 05:41

## 2017-04-08 RX ADMIN — CEFEPIME 2000 MG: 2 INJECTION, POWDER, FOR SOLUTION INTRAVENOUS at 05:41

## 2017-04-08 RX ADMIN — IOHEXOL 100 ML: 350 INJECTION, SOLUTION INTRAVENOUS at 11:39

## 2017-04-08 RX ADMIN — HEPARIN SODIUM 5000 UNITS: 5000 INJECTION, SOLUTION INTRAVENOUS; SUBCUTANEOUS at 17:36

## 2017-04-08 RX ADMIN — SODIUM CHLORIDE 75 ML/HR: 9 INJECTION, SOLUTION INTRAVENOUS at 15:15

## 2017-04-08 RX ADMIN — PANTOPRAZOLE SODIUM 40 MG: 40 INJECTION, POWDER, FOR SOLUTION INTRAVENOUS at 09:15

## 2017-04-08 RX ADMIN — SODIUM CHLORIDE, SODIUM GLUCONATE, SODIUM ACETATE, POTASSIUM CHLORIDE, MAGNESIUM CHLORIDE, SODIUM PHOSPHATE, DIBASIC, AND POTASSIUM PHOSPHATE 75 ML/HR: .53; .5; .37; .037; .03; .012; .00082 INJECTION, SOLUTION INTRAVENOUS at 07:34

## 2017-04-09 LAB
ANION GAP SERPL CALCULATED.3IONS-SCNC: 10 MMOL/L (ref 4–13)
BACTERIA UR CULT: NORMAL
BASOPHILS # BLD AUTO: 0.04 THOUSANDS/ΜL (ref 0–0.1)
BASOPHILS NFR BLD AUTO: 0 % (ref 0–1)
BUN SERPL-MCNC: 6 MG/DL (ref 5–25)
CALCIUM SERPL-MCNC: 8.6 MG/DL (ref 8.3–10.1)
CHLORIDE SERPL-SCNC: 108 MMOL/L (ref 100–108)
CO2 SERPL-SCNC: 25 MMOL/L (ref 21–32)
CREAT SERPL-MCNC: 0.71 MG/DL (ref 0.6–1.3)
EOSINOPHIL # BLD AUTO: 0.33 THOUSAND/ΜL (ref 0–0.61)
EOSINOPHIL NFR BLD AUTO: 4 % (ref 0–6)
ERYTHROCYTE [DISTWIDTH] IN BLOOD BY AUTOMATED COUNT: 13.9 % (ref 11.6–15.1)
GFR SERPL CREATININE-BSD FRML MDRD: >60 ML/MIN/1.73SQ M
GLUCOSE SERPL-MCNC: 125 MG/DL (ref 65–140)
GLUCOSE SERPL-MCNC: 128 MG/DL (ref 65–140)
GLUCOSE SERPL-MCNC: 146 MG/DL (ref 65–140)
GLUCOSE SERPL-MCNC: 175 MG/DL (ref 65–140)
GLUCOSE SERPL-MCNC: 184 MG/DL (ref 65–140)
HCT VFR BLD AUTO: 33.1 % (ref 36.5–49.3)
HGB BLD-MCNC: 10.7 G/DL (ref 12–17)
LYMPHOCYTES # BLD AUTO: 1.83 THOUSANDS/ΜL (ref 0.6–4.47)
LYMPHOCYTES NFR BLD AUTO: 19 % (ref 14–44)
MAGNESIUM SERPL-MCNC: 2 MG/DL (ref 1.6–2.6)
MCH RBC QN AUTO: 29.2 PG (ref 26.8–34.3)
MCHC RBC AUTO-ENTMCNC: 32.3 G/DL (ref 31.4–37.4)
MCV RBC AUTO: 90 FL (ref 82–98)
MONOCYTES # BLD AUTO: 0.98 THOUSAND/ΜL (ref 0.17–1.22)
MONOCYTES NFR BLD AUTO: 10 % (ref 4–12)
NEUTROPHILS # BLD AUTO: 6.28 THOUSANDS/ΜL (ref 1.85–7.62)
NEUTS SEG NFR BLD AUTO: 66 % (ref 43–75)
NRBC BLD AUTO-RTO: 0 /100 WBCS
PHOSPHATE SERPL-MCNC: 2.2 MG/DL (ref 2.3–4.1)
PLATELET # BLD AUTO: 290 THOUSANDS/UL (ref 149–390)
PMV BLD AUTO: 11.1 FL (ref 8.9–12.7)
POTASSIUM SERPL-SCNC: 3.3 MMOL/L (ref 3.5–5.3)
RBC # BLD AUTO: 3.67 MILLION/UL (ref 3.88–5.62)
SODIUM SERPL-SCNC: 143 MMOL/L (ref 136–145)
WBC # BLD AUTO: 9.52 THOUSAND/UL (ref 4.31–10.16)

## 2017-04-09 PROCEDURE — 80048 BASIC METABOLIC PNL TOTAL CA: CPT | Performed by: INTERNAL MEDICINE

## 2017-04-09 PROCEDURE — 83735 ASSAY OF MAGNESIUM: CPT | Performed by: INTERNAL MEDICINE

## 2017-04-09 PROCEDURE — C9113 INJ PANTOPRAZOLE SODIUM, VIA: HCPCS | Performed by: NURSE PRACTITIONER

## 2017-04-09 PROCEDURE — 94669 MECHANICAL CHEST WALL OSCILL: CPT

## 2017-04-09 PROCEDURE — 84100 ASSAY OF PHOSPHORUS: CPT | Performed by: INTERNAL MEDICINE

## 2017-04-09 PROCEDURE — 85025 COMPLETE CBC W/AUTO DIFF WBC: CPT | Performed by: INTERNAL MEDICINE

## 2017-04-09 PROCEDURE — 94668 MNPJ CHEST WALL SBSQ: CPT

## 2017-04-09 PROCEDURE — 82948 REAGENT STRIP/BLOOD GLUCOSE: CPT

## 2017-04-09 RX ORDER — POTASSIUM CHLORIDE 20 MEQ/1
40 TABLET, EXTENDED RELEASE ORAL ONCE
Status: COMPLETED | OUTPATIENT
Start: 2017-04-09 | End: 2017-04-09

## 2017-04-09 RX ADMIN — METOPROLOL TARTRATE 12.5 MG: 25 TABLET ORAL at 08:32

## 2017-04-09 RX ADMIN — METOPROLOL TARTRATE 12.5 MG: 25 TABLET ORAL at 21:29

## 2017-04-09 RX ADMIN — SODIUM CHLORIDE 75 ML/HR: 9 INJECTION, SOLUTION INTRAVENOUS at 03:00

## 2017-04-09 RX ADMIN — SODIUM CHLORIDE 75 ML/HR: 9 INJECTION, SOLUTION INTRAVENOUS at 15:46

## 2017-04-09 RX ADMIN — PANTOPRAZOLE SODIUM 40 MG: 40 INJECTION, POWDER, FOR SOLUTION INTRAVENOUS at 08:33

## 2017-04-09 RX ADMIN — HEPARIN SODIUM 5000 UNITS: 5000 INJECTION, SOLUTION INTRAVENOUS; SUBCUTANEOUS at 21:29

## 2017-04-09 RX ADMIN — Medication 250 MG: at 08:32

## 2017-04-09 RX ADMIN — HEPARIN SODIUM 5000 UNITS: 5000 INJECTION, SOLUTION INTRAVENOUS; SUBCUTANEOUS at 06:14

## 2017-04-09 RX ADMIN — SODIUM CHLORIDE 1000 MG: 9 INJECTION, SOLUTION INTRAVENOUS at 14:42

## 2017-04-09 RX ADMIN — HEPARIN SODIUM 5000 UNITS: 5000 INJECTION, SOLUTION INTRAVENOUS; SUBCUTANEOUS at 14:40

## 2017-04-09 RX ADMIN — POTASSIUM CHLORIDE 40 MEQ: 1500 TABLET, EXTENDED RELEASE ORAL at 14:48

## 2017-04-09 RX ADMIN — TAMSULOSIN HYDROCHLORIDE 0.4 MG: 0.4 CAPSULE ORAL at 17:28

## 2017-04-09 RX ADMIN — SODIUM CHLORIDE 75 ML/HR: 9 INJECTION, SOLUTION INTRAVENOUS at 14:41

## 2017-04-09 RX ADMIN — Medication 250 MG: at 17:28

## 2017-04-10 ENCOUNTER — APPOINTMENT (INPATIENT)
Dept: NON INVASIVE DIAGNOSTICS | Facility: HOSPITAL | Age: 82
DRG: 698 | End: 2017-04-10
Payer: MEDICARE

## 2017-04-10 LAB
ANION GAP SERPL CALCULATED.3IONS-SCNC: 6 MMOL/L (ref 4–13)
BACTERIA BLD CULT: NORMAL
BUN SERPL-MCNC: 5 MG/DL (ref 5–25)
CALCIUM SERPL-MCNC: 7.7 MG/DL (ref 8.3–10.1)
CHLORIDE SERPL-SCNC: 112 MMOL/L (ref 100–108)
CO2 SERPL-SCNC: 27 MMOL/L (ref 21–32)
CREAT SERPL-MCNC: 0.57 MG/DL (ref 0.6–1.3)
ERYTHROCYTE [DISTWIDTH] IN BLOOD BY AUTOMATED COUNT: 14.1 % (ref 11.6–15.1)
GFR SERPL CREATININE-BSD FRML MDRD: >60 ML/MIN/1.73SQ M
GLUCOSE SERPL-MCNC: 102 MG/DL (ref 65–140)
GLUCOSE SERPL-MCNC: 132 MG/DL (ref 65–140)
GLUCOSE SERPL-MCNC: 146 MG/DL (ref 65–140)
GLUCOSE SERPL-MCNC: 154 MG/DL (ref 65–140)
GLUCOSE SERPL-MCNC: 170 MG/DL (ref 65–140)
HCT VFR BLD AUTO: 28.9 % (ref 36.5–49.3)
HGB BLD-MCNC: 9 G/DL (ref 12–17)
MCH RBC QN AUTO: 28.5 PG (ref 26.8–34.3)
MCHC RBC AUTO-ENTMCNC: 31.1 G/DL (ref 31.4–37.4)
MCV RBC AUTO: 92 FL (ref 82–98)
PLATELET # BLD AUTO: 263 THOUSANDS/UL (ref 149–390)
PMV BLD AUTO: 10.3 FL (ref 8.9–12.7)
POTASSIUM SERPL-SCNC: 3.1 MMOL/L (ref 3.5–5.3)
RBC # BLD AUTO: 3.16 MILLION/UL (ref 3.88–5.62)
SODIUM SERPL-SCNC: 145 MMOL/L (ref 136–145)
WBC # BLD AUTO: 7.48 THOUSAND/UL (ref 4.31–10.16)

## 2017-04-10 PROCEDURE — 82948 REAGENT STRIP/BLOOD GLUCOSE: CPT

## 2017-04-10 PROCEDURE — 93306 TTE W/DOPPLER COMPLETE: CPT

## 2017-04-10 PROCEDURE — 80048 BASIC METABOLIC PNL TOTAL CA: CPT | Performed by: NURSE PRACTITIONER

## 2017-04-10 PROCEDURE — 92526 ORAL FUNCTION THERAPY: CPT

## 2017-04-10 PROCEDURE — C9113 INJ PANTOPRAZOLE SODIUM, VIA: HCPCS | Performed by: NURSE PRACTITIONER

## 2017-04-10 PROCEDURE — 85027 COMPLETE CBC AUTOMATED: CPT | Performed by: NURSE PRACTITIONER

## 2017-04-10 RX ORDER — POTASSIUM CHLORIDE 20 MEQ/1
40 TABLET, EXTENDED RELEASE ORAL 2 TIMES DAILY
Status: DISCONTINUED | OUTPATIENT
Start: 2017-04-10 | End: 2017-04-11 | Stop reason: HOSPADM

## 2017-04-10 RX ADMIN — TAMSULOSIN HYDROCHLORIDE 0.4 MG: 0.4 CAPSULE ORAL at 17:29

## 2017-04-10 RX ADMIN — POTASSIUM CHLORIDE 40 MEQ: 1500 TABLET, EXTENDED RELEASE ORAL at 11:03

## 2017-04-10 RX ADMIN — SODIUM CHLORIDE 75 ML/HR: 9 INJECTION, SOLUTION INTRAVENOUS at 06:20

## 2017-04-10 RX ADMIN — POTASSIUM CHLORIDE 40 MEQ: 1500 TABLET, EXTENDED RELEASE ORAL at 17:29

## 2017-04-10 RX ADMIN — Medication 250 MG: at 17:29

## 2017-04-10 RX ADMIN — METOPROLOL TARTRATE 12.5 MG: 25 TABLET ORAL at 08:41

## 2017-04-10 RX ADMIN — Medication 250 MG: at 08:41

## 2017-04-10 RX ADMIN — METOPROLOL TARTRATE 12.5 MG: 25 TABLET ORAL at 22:02

## 2017-04-10 RX ADMIN — HEPARIN SODIUM 5000 UNITS: 5000 INJECTION, SOLUTION INTRAVENOUS; SUBCUTANEOUS at 06:19

## 2017-04-10 RX ADMIN — PANTOPRAZOLE SODIUM 40 MG: 40 INJECTION, POWDER, FOR SOLUTION INTRAVENOUS at 08:41

## 2017-04-10 RX ADMIN — HEPARIN SODIUM 5000 UNITS: 5000 INJECTION, SOLUTION INTRAVENOUS; SUBCUTANEOUS at 22:00

## 2017-04-10 RX ADMIN — HEPARIN SODIUM 5000 UNITS: 5000 INJECTION, SOLUTION INTRAVENOUS; SUBCUTANEOUS at 14:21

## 2017-04-10 RX ADMIN — SODIUM CHLORIDE 1000 MG: 9 INJECTION, SOLUTION INTRAVENOUS at 14:22

## 2017-04-11 ENCOUNTER — APPOINTMENT (INPATIENT)
Dept: RADIOLOGY | Facility: HOSPITAL | Age: 82
DRG: 698 | End: 2017-04-11
Payer: MEDICARE

## 2017-04-11 ENCOUNTER — APPOINTMENT (INPATIENT)
Dept: PHYSICAL THERAPY | Facility: HOSPITAL | Age: 82
DRG: 698 | End: 2017-04-11
Payer: MEDICARE

## 2017-04-11 ENCOUNTER — GENERIC CONVERSION - ENCOUNTER (OUTPATIENT)
Dept: OTHER | Facility: OTHER | Age: 82
End: 2017-04-11

## 2017-04-11 VITALS
BODY MASS INDEX: 30.07 KG/M2 | WEIGHT: 198.41 LBS | RESPIRATION RATE: 18 BRPM | OXYGEN SATURATION: 96 % | SYSTOLIC BLOOD PRESSURE: 119 MMHG | HEIGHT: 68 IN | HEART RATE: 65 BPM | TEMPERATURE: 98 F | DIASTOLIC BLOOD PRESSURE: 57 MMHG

## 2017-04-11 LAB
ANION GAP SERPL CALCULATED.3IONS-SCNC: 8 MMOL/L (ref 4–13)
BACTERIA BLD CULT: NORMAL
BACTERIA BLD CULT: NORMAL
BACTERIA UR CULT: ABNORMAL
BACTERIA UR CULT: ABNORMAL
BACTERIA UR CULT: NORMAL
BACTERIA UR CULT: NORMAL
BUN SERPL-MCNC: 4 MG/DL (ref 5–25)
CALCIUM SERPL-MCNC: 8.7 MG/DL (ref 8.3–10.1)
CHLORIDE SERPL-SCNC: 108 MMOL/L (ref 100–108)
CO2 SERPL-SCNC: 26 MMOL/L (ref 21–32)
CREAT SERPL-MCNC: 0.73 MG/DL (ref 0.6–1.3)
GFR SERPL CREATININE-BSD FRML MDRD: >60 ML/MIN/1.73SQ M
GLUCOSE SERPL-MCNC: 112 MG/DL (ref 65–140)
GLUCOSE SERPL-MCNC: 147 MG/DL (ref 65–140)
GLUCOSE SERPL-MCNC: 168 MG/DL (ref 65–140)
GLUCOSE SERPL-MCNC: 175 MG/DL (ref 65–140)
POTASSIUM SERPL-SCNC: 3.8 MMOL/L (ref 3.5–5.3)
SODIUM SERPL-SCNC: 142 MMOL/L (ref 136–145)

## 2017-04-11 PROCEDURE — 97110 THERAPEUTIC EXERCISES: CPT

## 2017-04-11 PROCEDURE — 71010 HB CHEST X-RAY 1 VIEW FRONTAL: CPT

## 2017-04-11 PROCEDURE — 82948 REAGENT STRIP/BLOOD GLUCOSE: CPT

## 2017-04-11 PROCEDURE — C9113 INJ PANTOPRAZOLE SODIUM, VIA: HCPCS | Performed by: NURSE PRACTITIONER

## 2017-04-11 PROCEDURE — C1751 CATH, INF, PER/CENT/MIDLINE: HCPCS

## 2017-04-11 PROCEDURE — 36569 INSJ PICC 5 YR+ W/O IMAGING: CPT

## 2017-04-11 PROCEDURE — 97530 THERAPEUTIC ACTIVITIES: CPT

## 2017-04-11 PROCEDURE — 80048 BASIC METABOLIC PNL TOTAL CA: CPT | Performed by: NURSE PRACTITIONER

## 2017-04-11 PROCEDURE — 92526 ORAL FUNCTION THERAPY: CPT

## 2017-04-11 PROCEDURE — 02HV33Z INSERTION OF INFUSION DEVICE INTO SUPERIOR VENA CAVA, PERCUTANEOUS APPROACH: ICD-10-PCS | Performed by: FAMILY MEDICINE

## 2017-04-11 RX ORDER — POTASSIUM CHLORIDE 20 MEQ/1
40 TABLET, EXTENDED RELEASE ORAL DAILY
Qty: 7 TABLET | Refills: 0
Start: 2017-04-11 | End: 2017-04-11 | Stop reason: HOSPADM

## 2017-04-11 RX ORDER — SACCHAROMYCES BOULARDII 250 MG
250 CAPSULE ORAL 2 TIMES DAILY
Qty: 60 CAPSULE | Refills: 0
Start: 2017-04-11 | End: 2017-05-11

## 2017-04-11 RX ADMIN — METOPROLOL TARTRATE 12.5 MG: 25 TABLET ORAL at 09:53

## 2017-04-11 RX ADMIN — PANTOPRAZOLE SODIUM 40 MG: 40 INJECTION, POWDER, FOR SOLUTION INTRAVENOUS at 09:53

## 2017-04-11 RX ADMIN — HEPARIN SODIUM 5000 UNITS: 5000 INJECTION, SOLUTION INTRAVENOUS; SUBCUTANEOUS at 06:17

## 2017-04-11 RX ADMIN — Medication 250 MG: at 09:53

## 2017-04-11 RX ADMIN — HEPARIN SODIUM 5000 UNITS: 5000 INJECTION, SOLUTION INTRAVENOUS; SUBCUTANEOUS at 13:37

## 2017-04-11 RX ADMIN — POTASSIUM CHLORIDE 40 MEQ: 1500 TABLET, EXTENDED RELEASE ORAL at 09:53

## 2017-04-11 RX ADMIN — TAMSULOSIN HYDROCHLORIDE 0.4 MG: 0.4 CAPSULE ORAL at 16:12

## 2017-04-11 RX ADMIN — SODIUM CHLORIDE 1000 MG: 9 INJECTION, SOLUTION INTRAVENOUS at 14:17

## 2017-12-17 ENCOUNTER — APPOINTMENT (EMERGENCY)
Dept: CT IMAGING | Facility: HOSPITAL | Age: 82
DRG: 698 | End: 2017-12-17
Payer: MEDICARE

## 2017-12-17 ENCOUNTER — HOSPITAL ENCOUNTER (INPATIENT)
Facility: HOSPITAL | Age: 82
LOS: 5 days | Discharge: NURSING, REHAB OR LONG-TERM CARE | DRG: 698 | End: 2017-12-22
Attending: ANESTHESIOLOGY | Admitting: ANESTHESIOLOGY
Payer: MEDICARE

## 2017-12-17 ENCOUNTER — APPOINTMENT (EMERGENCY)
Dept: RADIOLOGY | Facility: HOSPITAL | Age: 82
DRG: 698 | End: 2017-12-17
Payer: MEDICARE

## 2017-12-17 DIAGNOSIS — I48.91 A-FIB (HCC): ICD-10-CM

## 2017-12-17 DIAGNOSIS — A41.9 SEPSIS DUE TO URINARY TRACT INFECTION (HCC): ICD-10-CM

## 2017-12-17 DIAGNOSIS — T83.511A URINARY TRACT INFECTION ASSOCIATED WITH INDWELLING URETHRAL CATHETER (HCC): ICD-10-CM

## 2017-12-17 DIAGNOSIS — E86.0 DEHYDRATION: ICD-10-CM

## 2017-12-17 DIAGNOSIS — N39.0 SEPSIS DUE TO URINARY TRACT INFECTION (HCC): ICD-10-CM

## 2017-12-17 DIAGNOSIS — T83.512A URINARY TRACT INFECTION ASSOCIATED WITH NEPHROSTOMY CATHETER, INITIAL ENCOUNTER (HCC): ICD-10-CM

## 2017-12-17 DIAGNOSIS — N39.0 URINARY TRACT INFECTION ASSOCIATED WITH NEPHROSTOMY CATHETER, INITIAL ENCOUNTER (HCC): ICD-10-CM

## 2017-12-17 DIAGNOSIS — A41.9 SEPTIC SHOCK (HCC): ICD-10-CM

## 2017-12-17 DIAGNOSIS — N39.0 URINARY TRACT INFECTION ASSOCIATED WITH INDWELLING URETHRAL CATHETER (HCC): ICD-10-CM

## 2017-12-17 DIAGNOSIS — R13.11 ORAL PHASE DYSPHAGIA: Primary | ICD-10-CM

## 2017-12-17 DIAGNOSIS — R65.21 SEPTIC SHOCK (HCC): ICD-10-CM

## 2017-12-17 PROBLEM — R50.9 FEVER: Status: ACTIVE | Noted: 2017-12-17

## 2017-12-17 LAB
ALBUMIN SERPL BCP-MCNC: 2.2 G/DL (ref 3.5–5)
ALP SERPL-CCNC: 64 U/L (ref 46–116)
ALT SERPL W P-5'-P-CCNC: 26 U/L (ref 12–78)
AMORPH PHOS CRY URNS QL MICRO: ABNORMAL /HPF
ANION GAP SERPL CALCULATED.3IONS-SCNC: 12 MMOL/L (ref 4–13)
AST SERPL W P-5'-P-CCNC: 12 U/L (ref 5–45)
ATRIAL RATE: 89 BPM
BACTERIA UR QL AUTO: ABNORMAL /HPF
BACTERIA UR QL AUTO: ABNORMAL /HPF
BASOPHILS # BLD AUTO: 0.07 THOUSANDS/ΜL (ref 0–0.1)
BASOPHILS NFR BLD AUTO: 0 % (ref 0–1)
BILIRUB SERPL-MCNC: 0.3 MG/DL (ref 0.2–1)
BILIRUB UR QL STRIP: NEGATIVE
BILIRUB UR QL STRIP: NEGATIVE
BUN SERPL-MCNC: 36 MG/DL (ref 5–25)
CALCIUM SERPL-MCNC: 9.3 MG/DL (ref 8.3–10.1)
CHLORIDE SERPL-SCNC: 110 MMOL/L (ref 100–108)
CLARITY UR: ABNORMAL
CLARITY UR: ABNORMAL
CO2 SERPL-SCNC: 28 MMOL/L (ref 21–32)
COLOR UR: YELLOW
COLOR UR: YELLOW
CREAT SERPL-MCNC: 1.22 MG/DL (ref 0.6–1.3)
EOSINOPHIL # BLD AUTO: 0.29 THOUSAND/ΜL (ref 0–0.61)
EOSINOPHIL NFR BLD AUTO: 2 % (ref 0–6)
ERYTHROCYTE [DISTWIDTH] IN BLOOD BY AUTOMATED COUNT: 13.4 % (ref 11.6–15.1)
FLUAV AG SPEC QL IA: NEGATIVE
FLUBV AG SPEC QL IA: NEGATIVE
GFR SERPL CREATININE-BSD FRML MDRD: 55 ML/MIN/1.73SQ M
GLUCOSE SERPL-MCNC: 238 MG/DL (ref 65–140)
GLUCOSE UR STRIP-MCNC: ABNORMAL MG/DL
GLUCOSE UR STRIP-MCNC: NEGATIVE MG/DL
HCT VFR BLD AUTO: 39.6 % (ref 36.5–49.3)
HGB BLD-MCNC: 12.1 G/DL (ref 12–17)
HGB UR QL STRIP.AUTO: ABNORMAL
HGB UR QL STRIP.AUTO: ABNORMAL
KETONES UR STRIP-MCNC: NEGATIVE MG/DL
KETONES UR STRIP-MCNC: NEGATIVE MG/DL
LACTATE SERPL-SCNC: 4.5 MMOL/L (ref 0.5–2)
LEUKOCYTE ESTERASE UR QL STRIP: ABNORMAL
LEUKOCYTE ESTERASE UR QL STRIP: ABNORMAL
LYMPHOCYTES # BLD AUTO: 1.99 THOUSANDS/ΜL (ref 0.6–4.47)
LYMPHOCYTES NFR BLD AUTO: 11 % (ref 14–44)
MCH RBC QN AUTO: 27.4 PG (ref 26.8–34.3)
MCHC RBC AUTO-ENTMCNC: 30.6 G/DL (ref 31.4–37.4)
MCV RBC AUTO: 90 FL (ref 82–98)
MONOCYTES # BLD AUTO: 0.74 THOUSAND/ΜL (ref 0.17–1.22)
MONOCYTES NFR BLD AUTO: 4 % (ref 4–12)
NEUTROPHILS # BLD AUTO: 14.28 THOUSANDS/ΜL (ref 1.85–7.62)
NEUTS SEG NFR BLD AUTO: 81 % (ref 43–75)
NITRITE UR QL STRIP: POSITIVE
NITRITE UR QL STRIP: POSITIVE
NON-SQ EPI CELLS URNS QL MICRO: ABNORMAL /HPF
NON-SQ EPI CELLS URNS QL MICRO: ABNORMAL /HPF
NRBC BLD AUTO-RTO: 0 /100 WBCS
P AXIS: 94 DEGREES
PH UR STRIP.AUTO: 7.5 [PH] (ref 4.5–8)
PH UR STRIP.AUTO: >=9 [PH] (ref 4.5–8)
PLATELET # BLD AUTO: 396 THOUSANDS/UL (ref 149–390)
PMV BLD AUTO: 12 FL (ref 8.9–12.7)
POTASSIUM SERPL-SCNC: 3.6 MMOL/L (ref 3.5–5.3)
PR INTERVAL: 240 MS
PROT SERPL-MCNC: 7.5 G/DL (ref 6.4–8.2)
PROT UR STRIP-MCNC: ABNORMAL MG/DL
PROT UR STRIP-MCNC: ABNORMAL MG/DL
QRS AXIS: -39 DEGREES
QRSD INTERVAL: 100 MS
QT INTERVAL: 378 MS
QTC INTERVAL: 459 MS
RBC # BLD AUTO: 4.41 MILLION/UL (ref 3.88–5.62)
RBC #/AREA URNS AUTO: ABNORMAL /HPF
RBC #/AREA URNS AUTO: ABNORMAL /HPF
SODIUM SERPL-SCNC: 150 MMOL/L (ref 136–145)
SP GR UR STRIP.AUTO: 1.02 (ref 1–1.03)
SP GR UR STRIP.AUTO: <=1.005 (ref 1–1.03)
T WAVE AXIS: 76 DEGREES
TRI-PHOS CRY URNS QL MICRO: ABNORMAL /HPF
UROBILINOGEN UR QL STRIP.AUTO: 1 E.U./DL
UROBILINOGEN UR QL STRIP.AUTO: 1 E.U./DL
VENTRICULAR RATE: 89 BPM
WBC # BLD AUTO: 17.68 THOUSAND/UL (ref 4.31–10.16)
WBC #/AREA URNS AUTO: ABNORMAL /HPF
WBC #/AREA URNS AUTO: ABNORMAL /HPF

## 2017-12-17 PROCEDURE — 87086 URINE CULTURE/COLONY COUNT: CPT | Performed by: PHYSICIAN ASSISTANT

## 2017-12-17 PROCEDURE — 87798 DETECT AGENT NOS DNA AMP: CPT | Performed by: PHYSICIAN ASSISTANT

## 2017-12-17 PROCEDURE — 85025 COMPLETE CBC W/AUTO DIFF WBC: CPT | Performed by: PHYSICIAN ASSISTANT

## 2017-12-17 PROCEDURE — 80053 COMPREHEN METABOLIC PANEL: CPT | Performed by: PHYSICIAN ASSISTANT

## 2017-12-17 PROCEDURE — 71020 HB CHEST X-RAY 2VW FRONTAL&LATL: CPT

## 2017-12-17 PROCEDURE — 96375 TX/PRO/DX INJ NEW DRUG ADDON: CPT

## 2017-12-17 PROCEDURE — 87400 INFLUENZA A/B EACH AG IA: CPT | Performed by: PHYSICIAN ASSISTANT

## 2017-12-17 PROCEDURE — 87077 CULTURE AEROBIC IDENTIFY: CPT | Performed by: PHYSICIAN ASSISTANT

## 2017-12-17 PROCEDURE — 87040 BLOOD CULTURE FOR BACTERIA: CPT | Performed by: PHYSICIAN ASSISTANT

## 2017-12-17 PROCEDURE — 84145 PROCALCITONIN (PCT): CPT | Performed by: ANESTHESIOLOGY

## 2017-12-17 PROCEDURE — 81001 URINALYSIS AUTO W/SCOPE: CPT | Performed by: PHYSICIAN ASSISTANT

## 2017-12-17 PROCEDURE — 87186 SC STD MICRODIL/AGAR DIL: CPT | Performed by: PHYSICIAN ASSISTANT

## 2017-12-17 PROCEDURE — 83605 ASSAY OF LACTIC ACID: CPT | Performed by: PHYSICIAN ASSISTANT

## 2017-12-17 PROCEDURE — 96365 THER/PROPH/DIAG IV INF INIT: CPT

## 2017-12-17 PROCEDURE — 93005 ELECTROCARDIOGRAM TRACING: CPT

## 2017-12-17 PROCEDURE — 93005 ELECTROCARDIOGRAM TRACING: CPT | Performed by: PHYSICIAN ASSISTANT

## 2017-12-17 PROCEDURE — 74177 CT ABD & PELVIS W/CONTRAST: CPT

## 2017-12-17 PROCEDURE — 36415 COLL VENOUS BLD VENIPUNCTURE: CPT | Performed by: PHYSICIAN ASSISTANT

## 2017-12-17 PROCEDURE — 96368 THER/DIAG CONCURRENT INF: CPT

## 2017-12-17 RX ORDER — SODIUM CHLORIDE, SODIUM GLUCONATE, SODIUM ACETATE, POTASSIUM CHLORIDE, MAGNESIUM CHLORIDE, SODIUM PHOSPHATE, DIBASIC, AND POTASSIUM PHOSPHATE .53; .5; .37; .037; .03; .012; .00082 G/100ML; G/100ML; G/100ML; G/100ML; G/100ML; G/100ML; G/100ML
1000 INJECTION, SOLUTION INTRAVENOUS ONCE
Status: COMPLETED | OUTPATIENT
Start: 2017-12-17 | End: 2017-12-17

## 2017-12-17 RX ORDER — CHLORHEXIDINE GLUCONATE 0.12 MG/ML
15 RINSE ORAL EVERY 12 HOURS SCHEDULED
Status: DISCONTINUED | OUTPATIENT
Start: 2017-12-17 | End: 2017-12-22 | Stop reason: HOSPADM

## 2017-12-17 RX ORDER — HEPARIN SODIUM 5000 [USP'U]/ML
5000 INJECTION, SOLUTION INTRAVENOUS; SUBCUTANEOUS EVERY 8 HOURS SCHEDULED
Status: DISCONTINUED | OUTPATIENT
Start: 2017-12-17 | End: 2017-12-22 | Stop reason: HOSPADM

## 2017-12-17 RX ORDER — SODIUM CHLORIDE, SODIUM GLUCONATE, SODIUM ACETATE, POTASSIUM CHLORIDE, MAGNESIUM CHLORIDE, SODIUM PHOSPHATE, DIBASIC, AND POTASSIUM PHOSPHATE .53; .5; .37; .037; .03; .012; .00082 G/100ML; G/100ML; G/100ML; G/100ML; G/100ML; G/100ML; G/100ML
250 INJECTION, SOLUTION INTRAVENOUS ONCE
Status: COMPLETED | OUTPATIENT
Start: 2017-12-17 | End: 2017-12-18

## 2017-12-17 RX ORDER — SODIUM CHLORIDE, SODIUM LACTATE, POTASSIUM CHLORIDE, CALCIUM CHLORIDE 600; 310; 30; 20 MG/100ML; MG/100ML; MG/100ML; MG/100ML
125 INJECTION, SOLUTION INTRAVENOUS CONTINUOUS
Status: DISCONTINUED | OUTPATIENT
Start: 2017-12-17 | End: 2017-12-20

## 2017-12-17 RX ADMIN — VANCOMYCIN HYDROCHLORIDE 750 MG: 750 INJECTION, SOLUTION INTRAVENOUS at 23:11

## 2017-12-17 RX ADMIN — SODIUM CHLORIDE, SODIUM GLUCONATE, SODIUM ACETATE, POTASSIUM CHLORIDE, MAGNESIUM CHLORIDE, SODIUM PHOSPHATE, DIBASIC, AND POTASSIUM PHOSPHATE 250 ML: .53; .5; .37; .037; .03; .012; .00082 INJECTION, SOLUTION INTRAVENOUS at 23:31

## 2017-12-17 RX ADMIN — IOHEXOL 100 ML: 350 INJECTION, SOLUTION INTRAVENOUS at 22:18

## 2017-12-17 RX ADMIN — SODIUM CHLORIDE, SODIUM GLUCONATE, SODIUM ACETATE, POTASSIUM CHLORIDE AND MAGNESIUM CHLORIDE 1000 ML: 526; 502; 368; 37; 30 INJECTION, SOLUTION INTRAVENOUS at 22:53

## 2017-12-17 RX ADMIN — SODIUM CHLORIDE, SODIUM GLUCONATE, SODIUM ACETATE, POTASSIUM CHLORIDE, MAGNESIUM CHLORIDE, SODIUM PHOSPHATE, DIBASIC, AND POTASSIUM PHOSPHATE 1000 ML: .53; .5; .37; .037; .03; .012; .00082 INJECTION, SOLUTION INTRAVENOUS at 22:34

## 2017-12-17 RX ADMIN — CEFEPIME HYDROCHLORIDE 2000 MG: 2 INJECTION, SOLUTION INTRAVENOUS at 22:48

## 2017-12-18 ENCOUNTER — APPOINTMENT (INPATIENT)
Dept: RADIOLOGY | Facility: HOSPITAL | Age: 82
DRG: 698 | End: 2017-12-18
Payer: MEDICARE

## 2017-12-18 PROBLEM — R79.89 INCREASED LACTIC ACID LEVEL: Status: ACTIVE | Noted: 2017-12-18

## 2017-12-18 PROBLEM — E43 PROTEIN-CALORIE MALNUTRITION, SEVERE (HCC): Status: ACTIVE | Noted: 2017-12-18

## 2017-12-18 LAB
ALBUMIN SERPL BCP-MCNC: 1.8 G/DL (ref 3.5–5)
ALP SERPL-CCNC: 53 U/L (ref 46–116)
ALT SERPL W P-5'-P-CCNC: 22 U/L (ref 12–78)
ANION GAP SERPL CALCULATED.3IONS-SCNC: 7 MMOL/L (ref 4–13)
ANION GAP SERPL CALCULATED.3IONS-SCNC: 7 MMOL/L (ref 4–13)
APTT PPP: 26 SECONDS (ref 23–35)
AST SERPL W P-5'-P-CCNC: 15 U/L (ref 5–45)
BASOPHILS # BLD AUTO: 0.04 THOUSANDS/ΜL (ref 0–0.1)
BASOPHILS NFR BLD AUTO: 0 % (ref 0–1)
BILIRUB SERPL-MCNC: 0.2 MG/DL (ref 0.2–1)
BUN SERPL-MCNC: 21 MG/DL (ref 5–25)
BUN SERPL-MCNC: 27 MG/DL (ref 5–25)
CALCIUM SERPL-MCNC: 8.2 MG/DL (ref 8.3–10.1)
CALCIUM SERPL-MCNC: 8.6 MG/DL (ref 8.3–10.1)
CHLORIDE SERPL-SCNC: 111 MMOL/L (ref 100–108)
CHLORIDE SERPL-SCNC: 111 MMOL/L (ref 100–108)
CO2 SERPL-SCNC: 31 MMOL/L (ref 21–32)
CO2 SERPL-SCNC: 32 MMOL/L (ref 21–32)
CORTIS SERPL-MCNC: 19 UG/DL
CREAT SERPL-MCNC: 0.93 MG/DL (ref 0.6–1.3)
CREAT SERPL-MCNC: 0.94 MG/DL (ref 0.6–1.3)
EOSINOPHIL # BLD AUTO: 0.3 THOUSAND/ΜL (ref 0–0.61)
EOSINOPHIL NFR BLD AUTO: 1 % (ref 0–6)
ERYTHROCYTE [DISTWIDTH] IN BLOOD BY AUTOMATED COUNT: 13.6 % (ref 11.6–15.1)
FLUAV AG SPEC QL: NORMAL
FLUBV AG SPEC QL: NORMAL
GFR SERPL CREATININE-BSD FRML MDRD: 76 ML/MIN/1.73SQ M
GFR SERPL CREATININE-BSD FRML MDRD: 77 ML/MIN/1.73SQ M
GLUCOSE SERPL-MCNC: 141 MG/DL (ref 65–140)
GLUCOSE SERPL-MCNC: 149 MG/DL (ref 65–140)
HCT VFR BLD AUTO: 35.1 % (ref 36.5–49.3)
HGB BLD-MCNC: 10.8 G/DL (ref 12–17)
INR PPP: 1.07 (ref 0.86–1.16)
LACTATE SERPL-SCNC: 1.9 MMOL/L (ref 0.5–2)
LACTATE SERPL-SCNC: 2.1 MMOL/L (ref 0.5–2)
LACTATE SERPL-SCNC: 2.3 MMOL/L (ref 0.5–2)
LACTATE SERPL-SCNC: 2.5 MMOL/L (ref 0.5–2)
LACTATE SERPL-SCNC: 2.7 MMOL/L (ref 0.5–2)
LYMPHOCYTES # BLD AUTO: 2.84 THOUSANDS/ΜL (ref 0.6–4.47)
LYMPHOCYTES NFR BLD AUTO: 13 % (ref 14–44)
MAGNESIUM SERPL-MCNC: 2.4 MG/DL (ref 1.6–2.6)
MAGNESIUM SERPL-MCNC: 2.4 MG/DL (ref 1.6–2.6)
MCH RBC QN AUTO: 27.9 PG (ref 26.8–34.3)
MCHC RBC AUTO-ENTMCNC: 30.8 G/DL (ref 31.4–37.4)
MCV RBC AUTO: 91 FL (ref 82–98)
MONOCYTES # BLD AUTO: 0.9 THOUSAND/ΜL (ref 0.17–1.22)
MONOCYTES NFR BLD AUTO: 4 % (ref 4–12)
NEUTROPHILS # BLD AUTO: 16.8 THOUSANDS/ΜL (ref 1.85–7.62)
NEUTS SEG NFR BLD AUTO: 79 % (ref 43–75)
NRBC BLD AUTO-RTO: 0 /100 WBCS
PHOSPHATE SERPL-MCNC: 2.6 MG/DL (ref 2.3–4.1)
PLATELET # BLD AUTO: 351 THOUSANDS/UL (ref 149–390)
PMV BLD AUTO: 12 FL (ref 8.9–12.7)
POTASSIUM SERPL-SCNC: 3.5 MMOL/L (ref 3.5–5.3)
POTASSIUM SERPL-SCNC: 3.8 MMOL/L (ref 3.5–5.3)
PROT SERPL-MCNC: 6.5 G/DL (ref 6.4–8.2)
PROTHROMBIN TIME: 14.1 SECONDS (ref 12.1–14.4)
RBC # BLD AUTO: 3.87 MILLION/UL (ref 3.88–5.62)
RSV B RNA SPEC QL NAA+PROBE: NORMAL
SODIUM SERPL-SCNC: 149 MMOL/L (ref 136–145)
SODIUM SERPL-SCNC: 150 MMOL/L (ref 136–145)
WBC # BLD AUTO: 21.17 THOUSAND/UL (ref 4.31–10.16)

## 2017-12-18 PROCEDURE — 83605 ASSAY OF LACTIC ACID: CPT | Performed by: NURSE PRACTITIONER

## 2017-12-18 PROCEDURE — 80048 BASIC METABOLIC PNL TOTAL CA: CPT | Performed by: NURSE PRACTITIONER

## 2017-12-18 PROCEDURE — 85610 PROTHROMBIN TIME: CPT | Performed by: ANESTHESIOLOGY

## 2017-12-18 PROCEDURE — 83605 ASSAY OF LACTIC ACID: CPT | Performed by: PHYSICIAN ASSISTANT

## 2017-12-18 PROCEDURE — 99285 EMERGENCY DEPT VISIT HI MDM: CPT

## 2017-12-18 PROCEDURE — 82533 TOTAL CORTISOL: CPT | Performed by: ANESTHESIOLOGY

## 2017-12-18 PROCEDURE — 83735 ASSAY OF MAGNESIUM: CPT | Performed by: ANESTHESIOLOGY

## 2017-12-18 PROCEDURE — 71010 HB CHEST X-RAY 1 VIEW FRONTAL (PORTABLE): CPT

## 2017-12-18 PROCEDURE — 36415 COLL VENOUS BLD VENIPUNCTURE: CPT | Performed by: PHYSICIAN ASSISTANT

## 2017-12-18 PROCEDURE — 80053 COMPREHEN METABOLIC PANEL: CPT | Performed by: ANESTHESIOLOGY

## 2017-12-18 PROCEDURE — 85025 COMPLETE CBC W/AUTO DIFF WBC: CPT | Performed by: ANESTHESIOLOGY

## 2017-12-18 PROCEDURE — 83735 ASSAY OF MAGNESIUM: CPT | Performed by: NURSE PRACTITIONER

## 2017-12-18 PROCEDURE — 85730 THROMBOPLASTIN TIME PARTIAL: CPT | Performed by: ANESTHESIOLOGY

## 2017-12-18 PROCEDURE — 84100 ASSAY OF PHOSPHORUS: CPT | Performed by: ANESTHESIOLOGY

## 2017-12-18 PROCEDURE — 83605 ASSAY OF LACTIC ACID: CPT | Performed by: ANESTHESIOLOGY

## 2017-12-18 RX ORDER — BISACODYL 10 MG
10 SUPPOSITORY, RECTAL RECTAL DAILY PRN
Status: DISCONTINUED | OUTPATIENT
Start: 2017-12-18 | End: 2017-12-22 | Stop reason: HOSPADM

## 2017-12-18 RX ORDER — POTASSIUM CHLORIDE 20 MEQ/1
40 TABLET, EXTENDED RELEASE ORAL ONCE
Status: COMPLETED | OUTPATIENT
Start: 2017-12-18 | End: 2017-12-18

## 2017-12-18 RX ORDER — POTASSIUM CHLORIDE 29.8 MG/ML
40 INJECTION INTRAVENOUS ONCE
Status: DISCONTINUED | OUTPATIENT
Start: 2017-12-18 | End: 2017-12-18

## 2017-12-18 RX ORDER — POTASSIUM CHLORIDE 14.9 MG/ML
20 INJECTION INTRAVENOUS
Status: DISPENSED | OUTPATIENT
Start: 2017-12-18 | End: 2017-12-18

## 2017-12-18 RX ADMIN — HEPARIN SODIUM 5000 UNITS: 5000 INJECTION, SOLUTION INTRAVENOUS; SUBCUTANEOUS at 21:31

## 2017-12-18 RX ADMIN — MEROPENEM 1000 MG: 1 INJECTION, POWDER, FOR SOLUTION INTRAVENOUS at 08:21

## 2017-12-18 RX ADMIN — CHLORHEXIDINE GLUCONATE 15 ML: 1.2 RINSE ORAL at 21:31

## 2017-12-18 RX ADMIN — SODIUM CHLORIDE, SODIUM LACTATE, POTASSIUM CHLORIDE, AND CALCIUM CHLORIDE 125 ML/HR: .6; .31; .03; .02 INJECTION, SOLUTION INTRAVENOUS at 00:15

## 2017-12-18 RX ADMIN — FAMOTIDINE 20 MG: 10 INJECTION, SOLUTION INTRAVENOUS at 02:58

## 2017-12-18 RX ADMIN — HEPARIN SODIUM 5000 UNITS: 5000 INJECTION, SOLUTION INTRAVENOUS; SUBCUTANEOUS at 00:22

## 2017-12-18 RX ADMIN — POTASSIUM CHLORIDE 40 MEQ: 1500 TABLET, EXTENDED RELEASE ORAL at 18:20

## 2017-12-18 RX ADMIN — SODIUM CHLORIDE, SODIUM LACTATE, POTASSIUM CHLORIDE, AND CALCIUM CHLORIDE 500 ML: .6; .31; .03; .02 INJECTION, SOLUTION INTRAVENOUS at 15:07

## 2017-12-18 RX ADMIN — FAMOTIDINE 20 MG: 10 INJECTION, SOLUTION INTRAVENOUS at 08:21

## 2017-12-18 RX ADMIN — MEROPENEM 1000 MG: 1 INJECTION, POWDER, FOR SOLUTION INTRAVENOUS at 16:22

## 2017-12-18 RX ADMIN — HEPARIN SODIUM 5000 UNITS: 5000 INJECTION, SOLUTION INTRAVENOUS; SUBCUTANEOUS at 08:29

## 2017-12-18 RX ADMIN — VANCOMYCIN HYDROCHLORIDE 750 MG: 750 INJECTION, SOLUTION INTRAVENOUS at 11:03

## 2017-12-18 RX ADMIN — POTASSIUM CHLORIDE 20 MEQ: 200 INJECTION, SOLUTION INTRAVENOUS at 15:15

## 2017-12-18 RX ADMIN — SODIUM CHLORIDE, SODIUM LACTATE, POTASSIUM CHLORIDE, AND CALCIUM CHLORIDE 500 ML: .6; .31; .03; .02 INJECTION, SOLUTION INTRAVENOUS at 12:49

## 2017-12-18 RX ADMIN — HEPARIN SODIUM 5000 UNITS: 5000 INJECTION, SOLUTION INTRAVENOUS; SUBCUTANEOUS at 13:49

## 2017-12-18 RX ADMIN — CHLORHEXIDINE GLUCONATE 15 ML: 1.2 RINSE ORAL at 08:21

## 2017-12-18 RX ADMIN — FAMOTIDINE 20 MG: 10 INJECTION, SOLUTION INTRAVENOUS at 21:31

## 2017-12-18 NOTE — H&P
History and Physical - Critical Care  Gayla Mcdaniels 81 y o  male MRN: [id number]  Unit/Bed#: ED 26 Encounter: [id number]     Reason for Admission / Chief Complaint: Urosepsis     History of Present Illness:  Gayla Mcdaniels is a 81 y o  male with a past medical history of UTIs, obstructive uropathy, peripheral vascular disease, prostate CA, hypertension, dysphagia, type 2 diabetes mellitus, and atrial fibrillation who presented to the emergency department from [facility] for reddened/inflamed nephrostomy site which has also been reported to be containing air, also has been febrile with a temp 100 0° F and decreased mobility/responsiveness  After arrival in emergency department lab studies and radiologic studies were obtained, which found that his white blood cell count was 17 68 lactic acid 4 5, sodium 150, BUN of 36, and creatinine 1 22  Treatment while in emergency department included 2 5 L bolus normal saline, 1 dose of atropine and 1 dose of vancomycin, critical Care Medicine was then contacted for continuation of care  Upon critical care medicine exam patient was received in emergency department room 26 lying supine comfortably on stretcher, nonverbal, patient's daughter at bedside, patient's daughter stated that his mental status is slightly more somnolent than is usual however he is conscious and alert, pleasant in nature, affect bright, nephrostomy tube noted to be coming out left flank area, with area around it mildly erythemic, urine draining pain early into container back  Patient also has a permanent Wayne catheter in place which is draining cloudy urine with mucus present after review of chart, exam, and review patient will be admitted to intensive care unit as a step-down level 1 patient  Critical care on call attending notified  History obtained from chart review and unobtainable from patient due to mental status       Past Medical History:  Past Medical History: Diagnosis Date    Asthma     Atrial fibrillation [facility])     Cardiac disease     Decubitus ulcer     Diabetes mellitus ([place] [address] )     Dysphagia     HTN (hypertension)     Prostate CA ([place] [address] )     PVD (peripheral vascular disease) ([place] [address] )     Uropathy, obstructive     UTI (urinary tract infection)         Past Surgical History:  Past Surgical History:   Procedure Laterality Date    NEPHROSTOMY W/ INTRODUCTION OF CATHETER Left         Past Family History:  History reviewed  No pertinent family history  Social History:  History   Smoking Status    Never Smoker   Smokeless Tobacco    Not on file     History   Alcohol Use No     History   Drug Use No     Marital Status:   Exercise History: n/a     Medications:  Current Facility-Administered Medications   Medication Dose Route Frequency    [START ON 12/18/2017] cefepime (MAXIPIME) IVPB (premix) 2,000 mg  2,000 mg Intravenous Q12H    chlorhexidine (PERIDEX) 0 12 % oral rinse 15 mL  15 mL Swish & Spit Q12H [facility]    heparin (porcine) subcutaneous injection 5,000 Units  5,000 Units Subcutaneous Q8H [facility]    lactated ringers infusion  125 mL/hr Intravenous Continuous    multi-electrolyte (ISOLYTE-S PH 7 4) bolus 1,000 mL  1,000 mL Intravenous Once    multi-electrolyte (ISOLYTE-S PH 7 4) bolus 250 mL  250 mL Intravenous Once    [START ON 12/18/2017] vancomycin (VANCOCIN) 1,250 mg in sodium chloride 0 9 % 250 mL IVPB  15 mg/kg Intravenous Q12H    vancomycin (VANCOCIN) IVPB (premix) 750 mg  10 mg/kg Intravenous Once     Home medications:  Prior to Admission medications    Medication Sig Start Date End Date Taking?  Authorizing Provider   atorvastatin (LIPITOR) 40 mg tablet Take 40 mg by mouth Unable to confirm schedule    Historical Provider, MD   diltiazem (CARDIZEM) 120 MG tablet Take 120 mg by mouth    Historical Provider, MD   furosemide (LASIX) 40 mg tablet Take 40 mg by mouth Unable to confirm schedule    Historical Provider, MD   magnesium hydroxide (MILK OF MAGNESIA) 400 mg/5 mL oral suspension Take by mouth Unable to confirm schedule    Historical Provider, MD   metFORMIN (GLUCOPHAGE) 500 mg tablet Take 500 mg by mouth Unable to confirm schedule    Historical Provider, MD   metoprolol tartrate (LOPRESSOR) 50 mg tablet Take 50 mg by mouth Unable to confirm schedule    Historical Provider, MD   spironolactone (ALDACTONE) 50 mg tablet Take 50 mg by mouth Unable to confirm schedule    Historical Provider, MD   tamsulosin (FLOMAX) 0 4 mg Take 0 4 mg by mouth Unable to confirm schedule    Historical Provider, MD     Allergies:  No Known Allergies     ROS:   Review of Systems   Constitutional: Positive for activity change, appetite change and fever  Negative for chills and fatigue  HENT: Positive for drooling  Eyes: Negative  Respiratory: Negative  Cardiovascular: Negative  Gastrointestinal: Negative  Endocrine: Negative  Genitourinary: Positive for decreased urine volume and flank pain  Musculoskeletal: Negative for arthralgias and back pain  Skin: Negative  Allergic/Immunologic: Negative  Neurological: Positive for weakness  Hematological: Negative  Psychiatric/Behavioral: Negative  Vitals:  Vitals:    17 2059 17 2126 17 2255 17 2300   BP:   109/58    Pulse:   86    Resp:   (!) 30    Temp: 98 2 °F (36 8 °C) 100 1 °F (37 8 °C)     TempSrc: Oral Rectal     SpO2: 96%  100%    Weight:       Height:    5' 8" (1 727 m)     Temperature:   Temp (24hrs), Av 2 °F (37 3 °C), Min:98 2 °F (36 8 °C), Max:100 1 °F (37 8 °C)    Current: Temperature: 100 1 °F (37 8 °C)     Weights:   IBW: 68 4 kg  Body mass index is 25 31 kg/m²       Hemodynamic Monitoring:  N/A     Non-Invasive/Invasive Ventilation Settings:  Respiratory    Lab Data (Last 4 hours)    None         O2/Vent Data (Last 4 hours)    None              No results found for: PHART, HQU0PNZ, PO2ART, YPH6TOT, P5VFCYBD, BEART, SOURCE  SpO2: SpO2: 100 % Physical Exam:  Physical Exam   Constitutional: He appears well-developed and well-nourished  HENT:   Head: Normocephalic and atraumatic  Eyes: Conjunctivae and EOM are normal  Pupils are equal, round, and reactive to light  Right eye exhibits no discharge  No scleral icterus  Neck: Normal range of motion  Neck supple  No JVD present  No tracheal deviation present  No thyromegaly present  Cardiovascular: Regular rhythm, normal heart sounds and intact distal pulses  Exam reveals no gallop and no friction rub  No murmur heard  Pulmonary/Chest: Effort normal and breath sounds normal  No stridor  No respiratory distress  He has no wheezes  He has no rales  He exhibits no tenderness  Abdominal: Soft  Bowel sounds are normal  He exhibits no distension and no mass  There is no tenderness  There is no rebound and no guarding  Musculoskeletal: He exhibits no edema, tenderness or deformity  Lymphadenopathy:     He has no cervical adenopathy  Neurological: He is alert  He has normal reflexes  He displays normal reflexes  No cranial nerve deficit  He exhibits normal muscle tone  Coordination normal    Skin: Skin is warm and dry  No rash noted  No erythema  No pallor  Psychiatric: He has a normal mood and affect   His behavior is normal         Labs:    Results from last 7 days  Lab Units 12/17/17 2139   WBC Thousand/uL 17 68*   HEMOGLOBIN g/dL 12 1   HEMATOCRIT % 39 6   PLATELETS Thousands/uL 396*   NEUTROS PCT % 81*   MONOS PCT % 4      Results from last 7 days  Lab Units 12/17/17 2139   SODIUM mmol/L 150*   POTASSIUM mmol/L 3 6   CHLORIDE mmol/L 110*   CO2 mmol/L 28   BUN mg/dL 36*   CREATININE mg/dL 1 22   CALCIUM mg/dL 9 3   TOTAL PROTEIN g/dL 7 5   BILIRUBIN TOTAL mg/dL 0 30   ALK PHOS U/L 64   ALT U/L 26   AST U/L 12   GLUCOSE RANDOM mg/dL 238*                    Results from last 7 days  Lab Units 12/17/17 2139   LACTIC ACID mmol/L 4 5*       0  Lab Value Date/Time   TROPONINI <0 02 04/06/2017 [place] <0 02 2017 2301   TROPONINI <0 02 2017 1602        Imaging:  CXR and CT abdomen pelvis obtained, results pending I have personally reviewed pertinent films in PACS  EKst deg AV Block w/ occ PAC  This was personally reviewed by myself  Micro:  Lab Results   Component Value Date    BLOODCX No Growth After 5 Days  2017    BLOODCX No Growth After 5 Days  2017    BLOODCX No Growth After 5 Days  2017    URINECX No Growth <1000 cfu/mL 2017    URINECX >100,000 cfu/ml Mixed Contaminants X4 2017    URINECX  2017     50,000-59,000 cfu/ml Gram Negative Kris resembling Escherichia coli       Assessment:   - urosepsis  - hypovolemia  - hypernatremia  - lactic acidosis  - type 2 diabetes mellitus  - peripheral vascular disease  -atrial fibrillation        Plan:                  Neuro:  Continue to monitor neurologic status, avoid use of of benzodiazepines                 CV:  Atrial fibrillation:  Continue Cardizem and Lopressor  Maintain maps at 72 or greater   Continue volume resuscitation                 Lung:  No current issues   Encourage incentive spirometry and deep breathing                 GI: Pepcid BID for PUD prophylaxis                 FEN:  IV lactated Ringer's at 125 ml/hr, monitor and replete electrolytes as indicated, begin this patient diet in a m                  :  Chronic left-sided nephrostomy tube:  Has been in place for 2 years, consult Urology to examine and ensure proper placement  Also has chronic Wayne catheter in place, do not remove                   ID:  Started on empiric cefepime and vancomycin for probable urosepsis                 Heme:  Continue to monitor labs and replete as necessary                 Endo:  Monitor blood glucose as per ICU protocol                 Msk/Skin:  Small healing sacral decubitus Parris stage II with granulating tissue                 Disposition:  Remain in ICU as step-down level 1     VTE Pharmacologic Prophylaxis: Heparin  VTE Mechanical Prophylaxis: sequential compression device     Invasive lines and devices: Invasive Devices     Peripherally Inserted Central Catheter Line            PICC Line 89/53/22 Right Basilic 049 days          Peripheral Intravenous Line            Peripheral IV 12/17/17 Left Antecubital less than 1 day          Drain            Nephrostomy Left 256 days    Urethral Catheter 16 Fr  254 days                 Code Status: Level 1 - Full Code  POA:    POLST:       Given critical illness, patient length of stay will require greater than two midnights  Counseling / Coordination of Care  Total Critical Care time spent 60 minutes excluding procedures, teaching and family updates  Portions of the record may have been created with voice recognition software  Occasional wrong word or "sound a like" substitutions may have occurred due to the inherent limitations of voice recognition software  Read the chart carefully and recognize, using context, where substitutions have occurred          Doug Kunz

## 2017-12-18 NOTE — PLAN OF CARE
Problem: DISCHARGE PLANNING - CARE MANAGEMENT  Goal: Discharge to post-acute care or home with appropriate resources  INTERVENTIONS:  - Conduct assessment to determine patient/family and health care team treatment goals, and need for post-acute services based on payer coverage, community resources, and patient preferences, and barriers to discharge  - Address psychosocial, clinical, and financial barriers to discharge as identified in assessment in conjunction with the patient/family and health care team  - Arrange appropriate level of post-acute services according to patients   needs and preference and payer coverage in collaboration with the physician and health care team  - Communicate with and update the patient/family, physician, and health care team regarding progress on the discharge plan  - Arrange appropriate transportation to post-acute venues   Outcome: Progressing  According to nurses, patient is a resident at [facility]  He is total care for ADL's and is non ambulatory  Will return to [facility] when stable

## 2017-12-18 NOTE — ED PROVIDER NOTES
History  Chief Complaint   Patient presents with    Fever - 75 years or older     Pt presents from [facility] for reddened / inflammed nephrostomy site which was reported to be moving air, fever of 100F and decreased mobility/responsiveness  Fang Blackwell is a 81 y o  male w PMH asthma, AD, CAD, DM, HTN, UTI, PVD, obstructive uropathy who presents for evaluation of AMS  Patient presents with family members  He has had a slight change in mental status recently  The family are concerned that he has developed a urinary tract infection  He has a chronic Oral Short me tube and a chronic indwelling Wayne  He has had some bubbling and leaking from the nephrostomy site  He has a history of frequent UTIs  He is nearly nonverbal at baseline  Prior to Admission Medications   Prescriptions Last Dose Informant Patient Reported?  Taking?   atorvastatin (LIPITOR) 40 mg tablet   Yes No   Sig: Take 40 mg by mouth Unable to confirm schedule   diltiazem (CARDIZEM) 120 MG tablet   Yes No   Sig: Take 120 mg by mouth   furosemide (LASIX) 40 mg tablet   Yes No   Sig: Take 40 mg by mouth Unable to confirm schedule   magnesium hydroxide (MILK OF MAGNESIA) 400 mg/5 mL oral suspension   Yes No   Sig: Take by mouth Unable to confirm schedule   metFORMIN (GLUCOPHAGE) 500 mg tablet   Yes No   Sig: Take 500 mg by mouth Unable to confirm schedule   metoprolol tartrate (LOPRESSOR) 50 mg tablet   Yes No   Sig: Take 50 mg by mouth Unable to confirm schedule   spironolactone (ALDACTONE) 50 mg tablet   Yes No   Sig: Take 50 mg by mouth Unable to confirm schedule   tamsulosin (FLOMAX) 0 4 mg   Yes No   Sig: Take 0 4 mg by mouth Unable to confirm schedule      Facility-Administered Medications: None       Past Medical History:   Diagnosis Date    Asthma     Atrial fibrillation (HCC)     Cardiac disease     Decubitus ulcer     Diabetes mellitus ([facility] )     Dysphagia     HTN (hypertension)     [facility]     PVD (peripheral vascular disease) ([facility]     Uropathy, obstructive     UTI (urinary tract infection)        Past Surgical History:   Procedure Laterality Date    NEPHROSTOMY W/ INTRODUCTION OF CATHETER Left        History reviewed  No pertinent family history  I have reviewed and agree with the history as documented  Social History   Substance Use Topics    Smoking status: Never Smoker    Smokeless tobacco: Not on file    Alcohol use No        Review of Systems   Unable to perform ROS: Mental status change       Physical Exam  ED Triage Vitals   Temperature Pulse Respirations Blood Pressure SpO2   12/17/17 2059 12/17/17 2057 12/17/17 2057 12/17/17 2057 12/17/17 2057   98 2 °F (36 8 °C) 85 (!) 36 106/58 91 %      Temp Source Heart Rate Source Patient Position - Orthostatic VS BP Location FiO2 (%)   12/17/17 2059 12/17/17 2057 12/17/17 2057 12/17/17 2057 --   Oral Monitor Lying Right arm       Pain Score       12/18/17 0126       No Pain           Orthostatic Vital Signs  Vitals:    12/19/17 0239 12/19/17 0700 12/19/17 0800 12/19/17 1100   BP: 100/53 143/67 143/67 111/64   Pulse: 78 75 78 80   Patient Position - Orthostatic VS: Lying Lying  Lying       Physical Exam   Constitutional: He is oriented to person, place, and time  He appears well-developed and well-nourished  No distress  HENT:   Head: Normocephalic and atraumatic  Eyes: Pupils are equal, round, and reactive to light  Neck: Neck supple  No tracheal deviation present  Cardiovascular: Normal rate, regular rhythm and intact distal pulses  Exam reveals no gallop and no friction rub  No murmur heard  Pulmonary/Chest: Effort normal and breath sounds normal  No respiratory distress  He has no wheezes  He has no rales  Tachypnea   Abdominal: Soft  Bowel sounds are normal  He exhibits no distension and no mass  There is no tenderness  There is no guarding     The left nephrostomy tube site has no concerning cellulitis or skin changes surrounding the site, leakage from around the tube  The drainage is dark yellow urine  Genitourinary:   Genitourinary Comments: The indwelling Wayne is draining clear light yellow urine   Musculoskeletal: He exhibits no edema or deformity  Neurological: He is alert and oriented to person, place, and time  Patient is unable to speak any words, nearly nonverbal at baseline, he smiled and laughed a few comments that a make which is an appropriate response  He is able to follow 2 step commands  He has his eyes open spontaneously  Skin: Skin is warm and dry  Capillary refill takes less than 2 seconds  He is not diaphoretic  Clammy, non diaphoretic   Psychiatric: He has a normal mood and affect  His behavior is normal    Nursing note and vitals reviewed        ED Medications  Medications   chlorhexidine (PERIDEX) 0 12 % oral rinse 15 mL (15 mL Swish & Spit Given 12/19/17 0801)   lactated ringers infusion (125 mL/hr Intravenous New Bag 12/19/17 0300)   heparin (porcine) subcutaneous injection 5,000 Units (5,000 Units Subcutaneous Given 12/19/17 0525)   famotidine (PEPCID) injection 20 mg (20 mg Intravenous Given 12/19/17 0801)   meropenem (MERREM) 1,000 mg in sodium chloride 0 9 % 100 mL IVPB (0 mg Intravenous Stopped 12/19/17 0830)   potassium chloride 20 mEq IVPB (premix) (20 mEq Intravenous Not Given 12/18/17 1818)   bisacodyl (DULCOLAX) rectal suppository 10 mg (not administered)   vancomycin (VANCOCIN) IVPB (premix) 1,000 mg (not administered)   citalopram (CeleXA) tablet 10 mg (not administered)   mirtazapine (REMERON) tablet 7 5 mg (not administered)   atorvastatin (LIPITOR) tablet 40 mg (not administered)   metoprolol tartrate (LOPRESSOR) partial tablet 12 5 mg (12 5 mg Oral Given 12/19/17 1249)   dronabinol (MARINOL) capsule 5 mg (5 mg Oral Given 12/19/17 1250)   tamsulosin (FLOMAX) capsule 0 4 mg (not administered)   furosemide (LASIX) tablet 40 mg (40 mg Oral Given 12/19/17 1249)   multi-electrolyte (ISOLYTE-S PH 7 4) bolus 1,000 mL (0 mL Intravenous Stopped 12/17/17 2332)   multi-electrolyte (ISOLYTE-S PH 7 4) bolus 1,000 mL (0 mL Intravenous Stopped 12/17/17 2317)   cefepime (MAXIPIME) IVPB (premix) 2,000 mg (0 mg Intravenous Stopped 12/18/17 0016)   iohexol (OMNIPAQUE) 350 MG/ML injection (MULTI-DOSE) 100 mL (100 mL Intravenous Given 12/17/17 2218)   vancomycin (VANCOCIN) IVPB (premix) 750 mg (0 mg/kg × 75 5 kg Intravenous Stopped 12/18/17 0016)   multi-electrolyte (ISOLYTE-S PH 7 4) bolus 250 mL (0 mL Intravenous Stopped 12/18/17 0016)   lactated ringers bolus 500 mL (0 mL Intravenous Stopped 12/18/17 1349)   lactated ringers bolus 500 mL (0 mL Intravenous Stopped 12/18/17 1607)   potassium chloride (K-DUR,KLOR-CON) CR tablet 40 mEq (40 mEq Oral Given 12/18/17 1820)   potassium chloride (K-DUR,KLOR-CON) CR tablet 40 mEq (40 mEq Oral Given 12/19/17 0932)       Diagnostic Studies  Results Reviewed     Procedure Component Value Units Date/Time    Blood culture [[id number]] Collected:  12/17/17 2235    Lab Status:  Preliminary result Specimen:  Blood from Arm, Left Updated:  12/19/17 0801     Blood Culture No Growth at 24 hrs  Blood culture [[id number]] Collected:  12/17/17 2139    Lab Status:  Preliminary result Specimen:  Blood from Arm, Right Updated:  12/19/17 0801     Blood Culture No Growth at 24 hrs  Influenza A/B and RSV by PCR (Indicated for patients > 2 mo of age) [[id number]]  (Normal) Collected:  12/17/17 2240    Lab Status:  Final result Specimen:  Nasopharyngeal from Nasopharyngeal Swab Updated:  12/18/17 1147     INFLU A PCR None Detected     INFLU B PCR None Detected     RSV PCR None Detected    Lactic acid x2 Q2H [[id number]]  (Abnormal) Collected:  12/18/17 0215    Lab Status:  Final result Specimen:  Blood from Arm, Left Updated:  12/18/17 0239     LACTIC ACID 2 5 (HH) mmol/L     Narrative:         Result may be elevated if tourniquet was used during collection      Lactic acid x2 Q2H [[id number]] (Abnormal) Collected:  12/18/17 0020    Lab Status:  Final result Specimen:  Blood from Arm, Left Updated:  12/18/17 0105     LACTIC ACID 2 7 (HH) mmol/L     Narrative:         Result may be elevated if tourniquet was used during collection  Procalcitonin [[id number]] Collected:  12/17/17 2139    Lab Status: In process Specimen:  Blood from Arm, Right Updated:  12/17/17 2339    Urine Microscopic [[id number]]  (Abnormal) Collected:  12/17/17 2237    Lab Status:  Final result Specimen:  Urine from Urine, Indwelling Wayne Catheter Updated:  12/17/17 2322     RBC, UA 2-4 (A) /hpf      WBC, UA Innumerable (A) /hpf      Epithelial Cells None Seen /hpf      Bacteria, UA Moderate (A) /hpf     Rapid Influenza Screen with Reflex PCR (indicated for patients <2 mo of age) [[id number]]  (Normal) Collected:  12/17/17 2240    Lab Status:  Final result Specimen:  Nasopharyngeal from Nasopharyngeal Swab Updated:  12/17/17 2320     Rapid Influenza A Ag Negative     Rapid Influenza B Ag Negative    Platelet count [[id number]]     Lab Status:  No result Specimen:  Blood     Urine Microscopic [[id number]]  (Abnormal) Collected:  12/17/17 2237    Lab Status:  Final result Specimen:  Urine from Urine, Other Updated:  12/17/17 2317     RBC, UA 0-1 (A) /hpf      WBC, UA 30-50 (A) /hpf      Epithelial Cells None Seen /hpf      Bacteria, UA Innumerable (A) /hpf      AMORPH PHOSPATES Occasional /hpf      Triplep Phos [place], UA Occasional /hpf     Urine culture [[id number]] Collected:  12/17/17 2237    Lab Status:   In process Specimen:  Urine from Urine, Other Updated:  12/17/17 2316    UA w Reflex to Microscopic w Reflex to Culture [[id number]]  (Abnormal) Collected:  12/17/17 2237    Lab Status:  Final result Specimen:  Urine from Urine, Other Updated:  12/17/17 2258     Color, UA Yellow     Clarity, UA Cloudy     Specific Gravity, UA <=1 005     pH, UA >=9 0 (H)     Leukocytes, UA Large (A)     Nitrite, UA Positive (A)     Protein,  (2+) (A) mg/dl      Glucose,  (1/10%) (A) mg/dl      Ketones, UA Negative mg/dl      Urobilinogen, UA 1 0 E U /dl      Bilirubin, UA Negative     Blood, UA Trace-Intact (A)    UA w Reflex to Microscopic w Reflex to Culture [[id number]]  (Abnormal) Collected:  12/17/17 2237    Lab Status:  Final result Specimen:  Urine from Urine, Indwelling Wayne Catheter Updated:  12/17/17 2258     Color, UA Yellow     Clarity, UA Cloudy     Specific [place], UA 1 020     pH, UA 7 5     Leukocytes, UA Large (A)     Nitrite, UA Positive (A)     Protein, UA 30 (1+) (A) mg/dl      Glucose, UA Negative mg/dl      Ketones, UA Negative mg/dl      Urobilinogen, UA 1 0 E U /dl      Bilirubin, UA Negative     Blood, UA Small (A)    Lactic acid, plasma [[id number]]  (Abnormal) Collected:  12/17/17 2139    Lab Status:  Final result Specimen:  Blood from Arm, Right Updated:  12/17/17 2211     LACTIC ACID 4 5 (HH) mmol/L     Narrative:         Result may be elevated if tourniquet was used during collection  Comprehensive metabolic panel [[id number]]  (Abnormal) Collected:  12/17/17 2139    Lab Status:  Final result Specimen:  Blood from Arm, Right Updated:  12/17/17 2203     Sodium 150 (H) mmol/L      Potassium 3 6 mmol/L      Chloride 110 (H) mmol/L      CO2 28 mmol/L      Anion Gap 12 mmol/L      BUN 36 (H) mg/dL      Creatinine 1 22 mg/dL      Glucose 238 (H) mg/dL      Calcium 9 3 mg/dL      AST 12 U/L      ALT 26 U/L      Alkaline Phosphatase 64 U/L      Total Protein 7 5 g/dL      Albumin 2 2 (L) g/dL      Total Bilirubin 0 30 mg/dL      eGFR 55 ml/min/1 73sq m     Narrative:         National Kidney Disease Education Program recommendations are as follows:  GFR calculation is accurate only with a steady state creatinine  Chronic Kidney disease less than 60 ml/min/1 73 sq  meters  Kidney failure less than 15 ml/min/1 73 sq  meters      CBC and differential [[id number]]  (Abnormal) Collected:  12/17/17 2139    Lab Status:  Final result Specimen: Blood from Arm, Right Updated:  12/17/17 2147     WBC 17 68 (H) Thousand/uL      RBC 4 41 Million/uL      Hemoglobin 12 1 g/dL      Hematocrit 39 6 %      MCV 90 fL      MCH 27 4 pg      MCHC 30 6 (L) g/dL      RDW 13 4 %      MPV 12 0 fL      Platelets 164 (H) Thousands/uL      nRBC 0 /100 WBCs      Neutrophils Relative 81 (H) %      Lymphocytes Relative 11 (L) %      Monocytes Relative 4 %      Eosinophils Relative 2 %      Basophils Relative 0 %      Neutrophils Absolute 14 28 (H) Thousands/µL      Lymphocytes Absolute 1 99 Thousands/µL      Monocytes Absolute 0 74 Thousand/µL      Eosinophils Absolute 0 29 Thousand/µL      Basophils Absolute 0 07 Thousands/µL                  XR chest portable ICU   Final Result by Diana Carreno MD (12/18 4520)      No active pulmonary disease  Workstation performed: [id number]         XR chest 2 views   Final Result by Neida Singh MD (12/18 0441)      No active pulmonary disease  Workstation performed: [id number]         CT abdomen pelvis with contrast   Final Result by Alma Rosa Yao MD (12/18 2625)         1  Satisfactory position of left percutaneous nephrostomy tube with no enhancing collections along the course of the tube  No  hydronephrosis with enhancement of the thickened, renal pelvis concerning for pyelitis  2   Fecal impaction in the rectum  3   Wayne balloon catheter within the prosthetic urethra for which repositioning recommended  4   Enhancing, thickened bladder wall with development of multiloculated cystic lesion in the right seminal vesicle  Underlying cystitis should be excluded  5   Bibasilar infiltrates           Workstation performed: [id number]         IR consult    (Results Pending)              Procedures  CriticalCare Time  Performed by: Robin Leonard by: Alicia Tillman     Critical care provider statement:     Critical care time (minutes):  45    Critical care was necessary to treat or prevent imminent or life-threatening deterioration of the following conditions:  Sepsis and shock    Critical care was time spent personally by me on the following activities:  Blood draw for specimens, obtaining history from patient or surrogate, development of treatment plan with patient or surrogate, evaluation of patient's response to treatment, examination of patient, re-evaluation of patient's condition, ordering and review of laboratory studies and ordering and performing treatments and interventions  Comments:      Patient with urosepsis require IV antibiotics, massive fluid resuscitation, reassessments, trending and points, discussion with critical care team and consistent hemodynamic monitoring           Phone Contacts  ED Phone Contact    ED Course  ED Course                          Initial Sepsis Screening     [address] [address] Name 12/17/17 2217                Is the patient's history suggestive of a new or worsening infection? (!)  Yes (Proceed)  -SB        Suspected source of infection urinary tract infection  -SB        Are two or more of the following signs & symptoms of infection both present and new to the patient? (!)  Yes (Proceed)  -SB        Indicate SIRS criteria Altered mental status; Tachypnea > 20 resp per min  -SB        If the answer is yes to both questions, suspicion of sepsis is present          If severe sepsis is present AND tissue hypoperfusion perists in the hour after fluid resuscitation or lactate > 4, the patient meets criteria for SEPTIC SHOCK          Are any of the following organ dysfunction criteria present within 6 hours of suspected infection and SIRS criteria that are NOT considered to be chronic conditions?  (!)  Yes  -SB        Organ dysfunction          Date of presentation of severe sepsis 12/17/17  -SB        Time of presentation of severe sepsis 2214  -SB        Tissue hypoperfusion persists in the hour after crystalloid fluid administration, evidenced, by either: * OR * Lactate level is greater to or equal 4 mmol/dL ( ___ mmol/dL in comment field)   4 5  -SB        Was hypotension present within one hour of the conclusion of crystalloid fluid administration?         Date of presentation of septic shock          Time of presentation of septic shock            User Key  (r) = Recorded By, (t) = Taken By, (c) = Cosigned By    Initials Name Provider Type    JEFF Rayo PA-C Physician [address] [facility] (last 720 hours)      Sepsis Reassessment     Row Name 12/17/17 2253 12/17/17 2249                Volume Status and Tissue Perfusion Post Fluid Resuscitation- Must Document ALL of the Following:    Vital Signs Reviewed Yes  -SB Yes  -SB       Cardio Normal S1/S2  -SB Normal S1/S2  -SB       Pulmonary (!)  Tachypnea  -SB (!)  Tachypnea;Clear to auscultation  -SB       Capillary Refill Brisk  -SB Brisk  -SB       Peripheral Pulses Dorsalis Pedis; Posterior Tibialis;Radial  -SB Radial;Dorsalis Pedis; Posterior Tibialis  -SB       Peripheral Pulse +2  -SB +2  -SB       Dorsalis Pedis +2  -SB +2  -SB       Posterior Tibialis +2  -SB +2  -SB       Skin Warm;Normal;Pale  -SB Warm  -SB          *OR*   Intensive Monitoring- Must Document Two * of the Following Four *:    Vital Signs Reviewed           * Central Venous Pressure (CVP or RAP)           * Central Venous Oxygen (SVO2, ScvO2 or Oxygen saturation via central catheter)           * Bedside Cardiovascular US in IVC diameter and % collapse           * Passive Leg Raise OR Crystalloid Challenge             User Key  (r) = Recorded By, (t) = Taken By, (c) = Cosigned By    Initials Name Provider Type    JEFF Rayo PA-C Physician Assistant                MDM  Number of Diagnoses or Management Options  A-fib [facility]):   Dehydration:   Septic shock [facility]):   Diagnosis management comments: DDX includes but not ltd to:   Concern for recurrent UTI    Patient has 2 sites which could potentially be contaminated from his chronic indwelling for ostomy tube and Wayne  Concern for urosepsis  Suspect dehydration  Consider intra-abdominal abscess  Plan is to obtain:  CBC to check for anemia, leukocytosis, hydration status  Chemistry panel to check for lyte abnormalities, organ function   CT a/p to check for acute intra-abdominal pathology to explain symptomatology   UA for UTI, hydration status, hematuria (cx of both sites)  Lactate as marker of end organ dysfunction  PT/INR and PTT as marker of coagulopathy, DIC, end organ dysfunction  Blood cultures x 2, separate sites   Initiate broad spectrum abx following the attainment of cultures  Fluid resuscitation w 30cc/kg NSS bolus infusion   If initial lactate is elevated we will obtain a repeat in 2 hours to check end points     Based on results:  Patient in septic shock  He received broad-spectrum antibiotics, 30 cc/kg fluid bolus, multiple reassessments  He required disposition to ICU  Family updated  Portions of the record may have been created with voice recognition software   Occasional wrong word or "sound a like" substitutions may have occurred due to the inherent limitations of voice recognition software   Read the chart carefully and recognize, using context, where substitutions have occurred        CritCare Time    Disposition  Final diagnoses:   Septic shock ([facility] )   A-fib ([place] [address] )   Dehydration     Time reflects when diagnosis was documented in both MDM as applicable and the Disposition within this note     Time User Action Codes Description Comment    12/17/2017 11:19 PM Tatiana Ledesma Add [R13 11] Oral phase dysphagia     12/17/2017 11:26 PM Cassy Starch Add [A41 9,  R65 21] Septic shock ([facility] )     12/17/2017 11:26 PM Arvella Pereira [I48 91] A-fib ([facility] )     12/17/2017 11:26 PM Cassy Starch Add [E86 0] Dehydration     12/18/2017  2:52 PM Darnell Ag [U62 203U,  N39 0] Urinary tract infection associated with indwelling urethral catheter ([facility] )     12/18/2017 2:52 PM Ancelmo, [employer] [J14 614C,  N39 0] Urinary tract infection associated with nephrostomy catheter, initial encounter [facility])       ED Disposition     ED Disposition Condition Comment    Admit  Case was discussed with [facility] and the patient's admission status was agreed to be Admission Status: inpatient status to the service of Dr Carley Laura  Follow-up Information    None       Current Discharge Medication List      CONTINUE these medications which have NOT CHANGED    Details   atorvastatin (LIPITOR) 40 mg tablet Take 40 mg by mouth Unable to confirm schedule      diltiazem (CARDIZEM) 120 MG tablet Take 120 mg by mouth      furosemide (LASIX) 40 mg tablet Take 40 mg by mouth Unable to confirm schedule      magnesium hydroxide (MILK OF MAGNESIA) 400 mg/5 mL oral suspension Take by mouth Unable to confirm schedule      metFORMIN (GLUCOPHAGE) 500 mg tablet Take 500 mg by mouth Unable to confirm schedule      metoprolol tartrate (LOPRESSOR) 50 mg tablet Take 50 mg by mouth Unable to confirm schedule      spironolactone (ALDACTONE) 50 mg tablet Take 50 mg by mouth Unable to confirm schedule      tamsulosin (FLOMAX) 0 4 mg Take 0 4 mg by mouth Unable to confirm schedule           No discharge procedures on file      ED Provider  Electronically Signed by           Kandi Rayo PA-C  12/19/17 0488

## 2017-12-18 NOTE — ED NOTES
Diffuse blanchable wounds in positive stages of healing on buttocks     Zena Dejesus RN  12/17/17 2551

## 2017-12-18 NOTE — SEPSIS NOTE
Sepsis Note   Gustavo Iqbal 81 y o  male MRN: [id number]  Unit/Bed#: ED 26 Encounter: [id number]            Initial Sepsis Screening     Row Name 12/17/17 2217                Is the patient's history suggestive of a new or worsening infection? (!)  Yes (Proceed)  -SB        Suspected source of infection urinary tract infection  -SB        Are two or more of the following signs & symptoms of infection both present and new to the patient? (!)  Yes (Proceed)  -SB        Indicate SIRS criteria Altered mental status; Tachypnea > 20 resp per min  -SB        If the answer is yes to both questions, suspicion of sepsis is present          If severe sepsis is present AND tissue hypoperfusion perists in the hour after fluid resuscitation or lactate > 4, the patient meets criteria for SEPTIC SHOCK          Are any of the following organ dysfunction criteria present within 6 hours of suspected infection and SIRS criteria that are NOT considered to be chronic conditions? (!)  Yes  -SB        Organ dysfunction          Date of presentation of severe sepsis 12/17/17  -SB        Time of presentation of severe sepsis 2214  -SB        Tissue hypoperfusion persists in the hour after crystalloid fluid administration, evidenced, by either: * OR * Lactate level is greater to or equal 4 mmol/dL ( ___ mmol/dL in comment field)   4 5  -SB        Was hypotension present within one hour of the conclusion of crystalloid fluid administration?           Date of presentation of septic shock          Time of presentation of septic shock            User Key  (r) = Recorded By, (t) = Taken By, (c) = Cosigned By    Initials Name Provider Type    JEFF Rayo PA-C Physician Assistant

## 2017-12-18 NOTE — CASE MANAGEMENT
Initial Clinical Review    Admission: Date/Time/Statement: 12/17/17 @ 2306     Orders Placed This Encounter   Procedures    Inpatient Admission     Standing Status:   Standing     Number of Occurrences:   1     Order Specific Question:   Admitting Physician     Answer:   Carolina Robison     Order Specific Question:   Level of Care     Answer:   Level 1 Stepdown [13]     Order Specific Question:   Estimated length of stay     Answer:   More than 2 Midnights     Order Specific Question:   Certification     Answer:   I certify that inpatient services are medically necessary for this patient for a duration of greater than two midnights  See H&P and MD Progress Notes for additional information about the patient's course of treatment  ED: Date/Time/Mode of Arrival:   ED Arrival Information     Expected Arrival Acuity Means of Arrival Escorted By Service Admission Type    - 12/17/2017 20:52 Emergent Ambulance [address]  [facility] Critical Care/ICU Emergency    Arrival Complaint    FEVER          Chief Complaint:   Chief Complaint   Patient presents with    Fever - 75 years or older     Pt presents from [facility] for reddened / inflammed nephrostomy site which was reported to be moving air, fever of 100F and decreased mobility/responsiveness  History of Illness: Yoav Chan is a 81 y o  male with a past medical history of UTIs, obstructive uropathy, peripheral vascular disease, prostate CA, hypertension, dysphagia, type 2 diabetes mellitus, and atrial fibrillation who presented to the emergency department from [facility] for reddened/inflamed nephrostomy site which has also been reported to be containing air, also has been febrile with a temp 100 0° F and decreased mobility/responsiveness    After arrival in emergency department lab studies and radiologic studies were obtained, which found that his white blood cell count was 17 68 lactic acid 4 5, sodium 150, BUN of 36, and creatinine 1 22  Treatment while in emergency department included 2 5 L bolus normal saline, 1 dose of atropine and 1 dose of vancomycin, critical Care Medicine was then contacted for continuation of care  Upon critical care medicine exam patient was received in emergency department room 26 lying supine comfortably on stretcher, nonverbal, patient's daughter at bedside, patient's daughter stated that his mental status is slightly more somnolent than is usual however he is conscious and alert, pleasant in nature, affect bright, nephrostomy tube noted to be coming out left flank area, with area around it mildly erythemic, urine draining pain early into container back  Patient also has a permanent Wayne catheter in place which is draining cloudy urine with mucus present after review of chart, exam, and review patient will be admitted to intensive care unit as a step-down level 1 patient  Critical care on call attending notified        ED Vital Signs:   ED Triage Vitals   Temperature Pulse Respirations Blood Pressure SpO2   12/17/17 2059 12/17/17 2057 12/17/17 2057 12/17/17 2057 12/17/17 2057   98 2 °F (36 8 °C) 85 (!) 36 106/58 91 %      Temp Source Heart Rate Source Patient Position - Orthostatic VS BP Location FiO2 (%)   12/17/17 2059 12/17/17 2057 12/17/17 2057 12/17/17 2057 --   Oral Monitor Lying Right arm       Pain Score       12/18/17 0126       No Pain        Wt Readings from Last 1 Encounters:   12/17/17 75 5 kg (166 lb 7 2 oz)       Vital Signs (abnormal):   12/18/17 0400  98 9 °F (37 2 °C)  86 pulse   24 resp  102/57 BP   98 %  O2 sat    12/18/17 0300  98 9 °F (37 2 °C)  82   28  100/56   94 %    12/18/17 0245  --  79   29  --   97 %    12/18/17 0126  98 6 °F (37 °C)  84   30  98/56   99 %    12/18/17 0017  --  89   30  100/58   100 %    12/17/17 2255  --  86   30  109/58   100 %    12/17/17 2126  100 1 °F (37 8 °C)  --                 Abnormal Labs/Diagnostic Test Results:   CT abd/pelvis  1   Satisfactory position of left percutaneous nephrostomy tube with no enhancing collections along the course of the tube   No  hydronephrosis with enhancement of the thickened, renal pelvis concerning for pyelitis  2   Fecal impaction in the rectum  3   Wayne balloon catheter within the prosthetic urethra for which repositioning recommended  4   Enhancing, thickened bladder wall with development of multiloculated cystic lesion in the right seminal vesicle   Underlying cystitis should be excluded  5   Bibasilar infiltrates      Chest xray WNL    Results from last 7 days  Lab Units 12/17/17  2139   WBC Thousand/uL 17 68*   HEMOGLOBIN g/dL 12 1   HEMATOCRIT % 39 6   PLATELETS Thousands/uL 396*     Lab Units 12/17/17  2139   SODIUM mmol/L 150*   POTASSIUM mmol/L 3 6   CHLORIDE mmol/L 110*   CO2 mmol/L 28   BUN mg/dL 36*   CREATININE mg/dL 1 22   CALCIUM mg/dL 9 3   TOTAL PROTEIN g/dL 7 5   BILIRUBIN TOTAL mg/dL 0 30   ALK PHOS U/L 64   ALT U/L 26   AST U/L 12   GLUCOSE RANDOM mg/dL 238*       Lab Units 12/17/17  2139   LACTIC ACID mmol/L 4 5*           Lab Value Date/Time   TROPONINI <0 02 04/06/2017 0316   TROPONINI <0 02 04/05/2017 2301   TROPONINI <0 02 04/05/2017 1602      Urine   Color, UA Yellow    Clarity, UA Cloudy    Specific Gravity, UA <=1 005 1 003 - 1 030     pH, UA >=9 0 (H) 4 5 - 8 0     Leukocytes, UA Large (A) Negative     Nitrite, UA Positive (A) Negative     Protein,  (2+) (A) Negative mg/dl     Glucose,  (1/10%) (A) Negative mg/dl     Ketones, UA Negative Negative mg/dl     Urobilinogen, UA 1 0 0 2, 1 0 E U /dl E U /dl     Bilirubin, UA Negative Negative     Blood, UA Trace-Intact (A) Negative    UA w Reflex to Microscopic w Reflex to Culture [[id number]] (Abnormal) Collected: 12/17/17 2237   Lab Status: Final result Specimen: Urine from Urine, Indwelling Wayne Catheter Updated: 12/17/17 2256    Color, UA Yellow    Clarity, UA Cloudy    Specific [place], UA 1 020 1 003 - 1 030     pH, UA 7 5 4 5 - 8 0     Leukocytes, UA Large (A) Negative     Nitrite, UA Positive (A) Negative     Protein, UA 30 (1+) (A) Negative mg/dl     Glucose, UA Negative Negative mg/dl     Ketones, UA Negative Negative mg/dl     Urobilinogen, UA 1 0 0 2, 1 0 E U /dl E U /dl     Bilirubin, UA Negative Negative     Blood, UA Small (A) Negative    Urine Microscopic [[id number]] (Abnormal) Collected: 12/17/17 2237   Lab Status: Final result Specimen: Urine from Urine, Indwelling Wayne Catheter Updated: 12/17/17 2322    RBC, UA 2-4 (A) None Seen, 0-5 /hpf     WBC, UA Innumerable (A) None Seen, 0-5, 5-55, 5-65 /hpf     Epithelial Cells None Seen None Seen, Occasional /hpf     Bacteria, UA Moderate (A) None Seen, Occasional /hpf    Urine Microscopic [[id number]] (Abnormal) Collected: 12/17/17 2237   Lab Status: Final result Specimen: Urine from Urine, Other Updated: 12/17/17 2317    RBC, UA 0-1 (A) None Seen, 0-5 /hpf     WBC, UA 30-50 (A) None Seen, 0-5, 5-55, 5-65 /hpf     Epithelial Cells None Seen None Seen, Occasional /hpf     Bacteria, UA Innumerable (A) None Seen, Occasional /hpf     AMORPH PHOSPATES Occasional /hpf     Triplep Phos [place], UA Occasional /hpf      Blood cultures pending    EKG   Sinus rhythm with 1st degree A-V block with Premature atrial complexes  Left axis deviation  Possible Anterior infarct , age undetermined  Abnormal ECG          ED Treatment:   Medication Administration from 12/17/2017 2052 to 12/18/2017 0126       Date/Time Order Dose Route Action     12/17/2017 2253 multi-electrolyte (ISOLYTE-S PH 7 4) bolus 1,000 mL 1,000 mL Intravenous New Bag     12/17/2017 2234 multi-electrolyte (ISOLYTE-S PH 7 4) bolus 1,000 mL 1,000 mL Intravenous New Bag     12/17/2017 2248 cefepime (MAXIPIME) IVPB (premix) 2,000 mg 2,000 mg Intravenous New Bag     12/17/2017 2218 iohexol (OMNIPAQUE) 350 MG/ML injection (MULTI-DOSE) 100 mL 100 mL Intravenous Given     12/17/2017 6837 vancomycin (VANCOCIN) IVPB (premix) 750 mg 750 mg Intravenous New Bag     12/17/2017 2331 multi-electrolyte (ISOLYTE-S PH 7 4) bolus 250 mL 250 mL Intravenous New Bag     12/18/2017 0015 lactated ringers infusion 125 mL/hr Intravenous New Bag     12/18/2017 0022 heparin (porcine) subcutaneous injection 5,000 Units 5,000 Units Subcutaneous Given          Past Medical/Surgical History: Active Ambulatory Problems     Diagnosis Date Noted    Sepsis due to urinary tract infection ([facility] ) 04/05/2017    Urinary tract infection associated with indwelling urethral catheter ([facility] ) 04/05/2017    HTN (hypertension) 04/05/2017    DM (diabetes mellitus) ([place] [address] ) 04/05/2017    Decubitus ulcer of left buttock, stage 2 04/05/2017    Decubitus ulcer of left heel, unstageable ([place] [address] ) 04/05/2017    Dysphagia 04/05/2017    Bacteremia due to Gram-negative bacteria 04/06/2017    Urinary tract infection associated with nephrostomy catheter ([place] [address] ) 04/06/2017    Hypophosphatemia 04/06/2017    Hypokalemia 04/08/2017     Resolved Ambulatory Problems     Diagnosis Date Noted    Lactic acidosis 04/05/2017     Past Medical History:   Diagnosis Date    Asthma     Atrial fibrillation ([place] [address] )     Cardiac disease     Decubitus ulcer     Diabetes mellitus ([place] [address] )     Dysphagia     HTN (hypertension)     Prostate CA ([facility] )     PVD (peripheral vascular disease) ([place] [address] )     Uropathy, obstructive     UTI (urinary tract infection)        Admitting Diagnosis: Dehydration [E86 0]  A-fib ([facility]) [I48 91]  Fever [R50 9]  Oral phase dysphagia [R13 11]  Septic shock ([facility]) [A41 9, R65 21]    Age/Sex: 81 y o  male    Assessment/Plan:   Assessment:   - urosepsis  - hypovolemia  - hypernatremia  - lactic acidosis  - type 2 diabetes mellitus  - peripheral vascular disease  -atrial fibrillation        Plan:                  Neuro:  Continue to monitor neurologic status, avoid use of of benzodiazepines                 CV:  Atrial fibrillation:  Continue Cardizem and Lopressor               Maintain maps at 72 or greater             Continue volume resuscitation                 Lung:  No current issues             Encourage incentive spirometry and deep breathing                 GI: Pepcid BID for PUD prophylaxis                 FEN:  IV lactated Ringer's at 125 ml/hr, monitor and replete electrolytes as indicated, begin this patient diet in a m                 :  Chronic left-sided nephrostomy tube:  Has been in place for 2 years, consult Urology to examine and ensure proper placement  Also has chronic Wayne catheter in place, do not remove                  ID:  Started on empiric cefepime and vancomycin for probable urosepsis                 Heme:  Continue to monitor labs and replete as necessary                 Endo:  Monitor blood glucose as per ICU protocol                 Msk/Skin:  Small healing sacral decubitus Parris stage II with granulating tissue                 Disposition:  Remain in ICU as step-down level 1     VTE Pharmacologic Prophylaxis: Heparin  VTE Mechanical Prophylaxis: sequential compression device     Invasive lines and devices:       Invasive Devices            Peripherally Inserted Central Catheter Line                     PICC Line 33/17/07 Right Basilic 505 days                    Peripheral Intravenous Line                     Peripheral IV 12/17/17 Left Antecubital less than 1 day                    Drain                     Nephrostomy Left 256 days      Urethral Catheter 16 Fr  254 days                         Given critical illness, patient length of stay will require greater than two midnights          Admission Orders:  Scheduled Meds:   chlorhexidine 15 mL Swish & Spit Q12H [address]   famotidine 20 mg Intravenous Q12H [address]   heparin (porcine) 5,000 Units Subcutaneous Q8H [address]   meropenem 1,000 mg Intravenous Q8H   vancomycin 10 mg/kg Intravenous Q12H     Continuous Infusions:   lactated ringers 125 mL/hr Last Rate: 125 mL/hr (12/18/17 0516)     PRN Meds:

## 2017-12-18 NOTE — PLAN OF CARE
DISCHARGE PLANNING     Discharge to home or other facility with appropriate resources Progressing        DISCHARGE PLANNING - CARE MANAGEMENT     Discharge to post-acute care or home with appropriate resources Progressing        INFECTION - ADULT     Absence or prevention of progression during hospitalization Progressing        Knowledge Deficit     Patient/family/caregiver demonstrates understanding of disease process, treatment plan, medications, and discharge instructions Progressing        Nutrition/Hydration-ADULT     Nutrient/Hydration intake appropriate for improving, restoring or maintaining nutritional needs Progressing        PAIN - ADULT     Verbalizes/displays adequate comfort level or baseline comfort level Progressing        Potential for Falls     Patient will remain free of falls Progressing        Prexisting or High Potential for Compromised Skin Integrity     Skin integrity is maintained or improved Progressing        SAFETY ADULT     Patient will remain free of falls Progressing     Maintain or return to baseline ADL function Progressing     Maintain or return mobility status to optimal level Progressing

## 2017-12-18 NOTE — WOUND OSTOMY CARE
Progress Note - Wound   Kateryna Green 81 y o  male MRN: [id number]  Unit/Bed#:  Encounter: [id number]      Assessment:  Patient is 81year old male who presents with redness at nephrostomy site from [place] [address] home  Admitted with sepsis  Patient has a history of - UTI, obstructive uropathy, PVD, prostate CA, HTN, DM, a-fib, and dysphagia  Wound care to see patient for nursing documentation of pressure injuries on admission  Patient seen in bed, on ICU mattress  Incontinent of bowel and bladder  Incontinence care rendered at time of assessment  Assist of 2 with turning  Patient is dependent for care  R heel is intact with blanchable redness and dry skin  Dry skin noted to b/l feet  L heel is intact with calloused/scarred area  Recommend hydraguard cream to b/l heels and offloading with pillows  B/l buttocks and sacrum are intact with areas of healed scarring and blanchable hyperpigmentation  No wounds / pressure injuries noted  No opens areas  Areas of dry peeling skin with healed pink scarring underneath  Recommend hydraguard cream to this fragile skin  Sacrococcygeal dimple noted, skin is intact to this area as well  Primary nurse at bedside, aware of assessment findings  Wound care will sign off as patient has no active wounds  Plan:   1  Apply hydraguard to b/l buttocks, sacrum and heels TID and PRN for prevention and protection  2  Apply skin nourishing cream to the entire skin daily for moisture   3  Soft care cushion to chair when OOB   4  Turn and reposition patient every 2 hours   5  Elevate heels off of bed with pillows to offload     Objective:    Vitals: Blood pressure 102/50, pulse 73, temperature 97 6 °F (36 4 °C), temperature source Oral, resp  rate 14, height 5' 8" (1 727 m), weight 75 5 kg (166 lb 7 2 oz), SpO2 97 %  ,Body mass index is 25 31 kg/m²  Recommendations written as orders    Atha Current RN BSN

## 2017-12-18 NOTE — SOCIAL WORK
According to nurses, patient is a resident at [facility]  He is total care for ADL's and is non ambulatory  Will return to [facility] when stable

## 2017-12-18 NOTE — PROGRESS NOTES
Progress Note - Critical Care   Cynthia Steward 81 y o  male MRN: [id number]  Unit/Bed#:  Encounter: [id number]    Attending Physician: Joce Williamson MD      ______________________________________________________________________  Assessment and Plan:   Principal Problem:    Sepsis due to urinary tract infection ([facility] )  Active Problems:    Urinary tract infection associated with indwelling urethral catheter ([place] [address] )    HTN (hypertension)    DM (diabetes mellitus) ([place] [address] )    Decubitus ulcer of left buttock, stage 2    Urinary tract infection associated with nephrostomy catheter ([facility])    Fever    Increased lactic acid level    Protein-calorie malnutrition, severe ([place] [address] )  Resolved Problems:    * No resolved hospital problems  *        Neuro:  No acute issues identified  CV:  Continue Cardizem and Lopressor for atrial fibrillation for rate control  Maintain maps at 72 ir greater  Volume resuscitation is needed  Pulm:  Encourage incentive spirometry and deep breathing  Airway clearance protocol  GI:  No acute issues identified  :  Chronic left nephrostomy tube with questionable dislodgement  Still putting out urine  Urology consulted for same  Chronic Wayne in place, do not remove unless specified by Urology  History of ESBL UTIs    F/E/N:  Continue gentle hydration  ID:  Antibiotics changed from cefepime to meropenem for history of ESBL UTI  Heme:  Hemoglobin and platelets stable    Endo:  Glycemic control  Goal glucose between 140 and 180  Msk/Skin:  Decubitus ulcer present on admission    Disposition:  Continue with step-down level of care  Code Status: Level 1 - Full Code    Counseling / Coordination of Care  Total time spent today 30 minutes   Greater than 50% of total time was spent with the patient and / or family counseling and / or coordination of care   ______________________________________________________________________    24 Hour Events:  Admitted last night with sepsis, urinary tract infection and decubitus ulcer  Nephrostomy still continues to put out urine  ______________________________________________________________________    Physical Exam:   GEN:  No acute distress  HEENT:  Sclera anicteric, mucous membranes pink and moist, conjunctiva pink, no yasmeen/rhinorrhea  Neck:  No lymphadenopathy, normal ROM, supple, no bruits  CV :  S1S2, no murmurs, rubs or gallops  Intact distal pulses  No JVD  Resp:  Lungs diminished,CTA =B/L  No subq air or crepitus  Symmetrical expansion  No cough noted  GI :  Abd soft, nontender, no guarding/rebound, nondistended, normoactive bowel sounds X4 quads, no organomegaly appreciated  Gu:  Wayne with cloudy urine, left nephrostomy site reddened, urine cloudy  Neuro:  Eyes open spontaneously, follows commands as able, nonverbal at baseline         ______________________________________________________________________  Vitals:    17 0300 17 0400 17 0736 17 1121   BP: 100/56 102/57 106/53 102/50   Pulse: 82 86 88 73   Resp: (!) 28 (!) 24 20 14   Temp: 98 9 °F (37 2 °C) 98 9 °F (37 2 °C) 97 5 °F (36 4 °C) 97 6 °F (36 4 °C)   TempSrc:   Oral Oral   SpO2: 94% 98% 100% 97%   Weight:       Height:           Temperature:   Temp (24hrs), Av 5 °F (36 9 °C), Min:97 5 °F (36 4 °C), Max:100 1 °F (37 8 °C)    Current Temperature: 97 6 °F (36 4 °C)  Weights:   IBW: 68 4 kg    Body mass index is 25 31 kg/m²    Weight (last 2 days)     Date/Time   Weight    17  75 5 (166 45)            Hemodynamic Monitoring:  N/A     Non-Invasive/Invasive Ventilation Settings:  Respiratory    Lab Data (Last 4 hours)    None         O2/Vent Data (Last 4 hours)    None              No results found for: PHART, AWY5DTV, PO2ART, WVR3GLG, C6NGIVDW, BEART, SOURCE  SpO2: SpO2: 97 %  Intake and Outputs:  I/O        07 -  0700  07 -  0700  07 -  0700    I V  (mL/kg)   625 (8 3)    IV Piggyback   200    Total Intake(mL/kg)   825 (10 9)    Urine (mL/kg/hr)  850 575 (1)    Total Output   850 575    Net   -850 +250                 Nutrition:        Diet Orders            Start     Ordered    12/17/17 2307  Diet Regular; Regular House; Nectar Thick Liquid  Diet effective now     Question Answer Comment   Diet Type Regular    Regular Regular House    Other Restriction(s): Nectar Thick Liquid    RD to adjust diet per protocol? Yes        12/17/17 2319          Labs:     Results from last 7 days  Lab Units 12/18/17  0240 12/17/17  2139   WBC Thousand/uL 21 17* 17 68*   HEMOGLOBIN g/dL 10 8* 12 1   HEMATOCRIT % 35 1* 39 6   PLATELETS Thousands/uL 351 396*   NEUTROS PCT % 79* 81*   MONOS PCT % 4 4       Results from last 7 days  Lab Units 12/18/17  0240 12/17/17  2139   SODIUM mmol/L 150* 150*   POTASSIUM mmol/L 3 8 3 6   CHLORIDE mmol/L 111* 110*   CO2 mmol/L 32 28   BUN mg/dL 27* 36*   CREATININE mg/dL 0 94 1 22   CALCIUM mg/dL 8 6 9 3   TOTAL PROTEIN g/dL 6 5 7 5   BILIRUBIN TOTAL mg/dL 0 20 0 30   ALK PHOS U/L 53 64   ALT U/L 22 26   AST U/L 15 12   GLUCOSE RANDOM mg/dL 141* 238*       Results from last 7 days  Lab Units 12/18/17  0240   MAGNESIUM mg/dL 2 4     Lab Results   Component Value Date    PHOS 2 6 12/18/2017    PHOS 2 2 (L) 04/09/2017    PHOS 2 4 04/08/2017        Results from last 7 days  Lab Units 12/18/17  0240   INR  1 07   PTT seconds 26       0  Lab Value Date/Time   TROPONINI <0 02 04/06/2017 0316   TROPONINI <0 02 04/05/2017 2301   TROPONINI <0 02 04/05/2017 1602       Results from last 7 days  Lab Units 12/18/17  0444 12/18/17  0215 12/18/17  0020   LACTIC ACID mmol/L 2 1* 2 5* 2 7*     ABG:No results found for: PHART, LJB4NHK, PO2ART, EZW9ASM, T4RZFAVQ, BEART, SOURCE  Imaging:   XR chest portable ICU   Final Result      No active pulmonary disease  Workstation performed: [id number]         XR chest 2 views   Final Result      No active pulmonary disease           Workstation performed: [id number] CT abdomen pelvis with contrast   Final Result         1  Satisfactory position of left percutaneous nephrostomy tube with no enhancing collections along the course of the tube  No  hydronephrosis with enhancement of the thickened, renal pelvis concerning for pyelitis  2   Fecal impaction in the rectum  3   Wayne balloon catheter within the prosthetic urethra for which repositioning recommended  4   Enhancing, thickened bladder wall with development of multiloculated cystic lesion in the right seminal vesicle  Underlying cystitis should be excluded  5   Bibasilar infiltrates  Workstation performed: [id number]          I have personally reviewed pertinent reports  EKG:  Sinus rhythm on the monitor  Micro:  Lab Results   Component Value Date    BLOODCX No Growth After 5 Days  04/06/2017    BLOODCX No Growth After 5 Days  04/06/2017    BLOODCX No Growth After 5 Days  04/05/2017    URINECX No Growth <1000 cfu/mL 04/08/2017    URINECX >100,000 cfu/ml Mixed Contaminants X4 04/05/2017    URINECX  04/05/2017     50,000-59,000 cfu/ml Gram Negative Kris resembling Escherichia coli     Allergies: No Known Allergies  Medications:   Scheduled Meds:  chlorhexidine 15 mL Swish & Spit Q12H [address]   famotidine 20 mg Intravenous Q12H [address]   heparin (porcine) 5,000 Units Subcutaneous Q8H [address]   lactated ringers 500 mL Intravenous Once   meropenem 1,000 mg Intravenous Q8H   vancomycin 10 mg/kg Intravenous Q12H     Continuous Infusions:  lactated ringers 125 mL/hr Last Rate: 125 mL/hr (12/18/17 0516)     PRN Meds:     VTE Pharmacologic Prophylaxis: Heparin  VTE Mechanical Prophylaxis: sequential compression device  Invasive lines and devices:   Invasive Devices     Peripherally Inserted Central Catheter Line            PICC Line 01/02/53 Right Basilic 135 days          Peripheral Intravenous Line            Peripheral IV 12/17/17 Right Antecubital 1 day    Peripheral IV 12/17/17 Left Antecubital less than 1 day Drain            Nephrostomy Left 256 days    Urethral Catheter 16 Fr  254 days                     Portions of the record may have been created with voice recognition software  Occasional wrong word or "sound a like" substitutions may have occurred due to the inherent limitations of voice recognition software  Read the chart carefully and recognize, using context, where substitutions have occurred      BILLIE Rosales

## 2017-12-19 ENCOUNTER — APPOINTMENT (INPATIENT)
Dept: INTERVENTIONAL RADIOLOGY/VASCULAR | Facility: HOSPITAL | Age: 82
DRG: 698 | End: 2017-12-19
Payer: MEDICARE

## 2017-12-19 ENCOUNTER — APPOINTMENT (INPATIENT)
Dept: CT IMAGING | Facility: HOSPITAL | Age: 82
DRG: 698 | End: 2017-12-19
Payer: MEDICARE

## 2017-12-19 PROBLEM — E87.0 HYPERNATREMIA: Status: ACTIVE | Noted: 2017-12-19

## 2017-12-19 LAB
ANION GAP SERPL CALCULATED.3IONS-SCNC: 7 MMOL/L (ref 4–13)
BACTERIA UR QL AUTO: ABNORMAL /HPF
BASOPHILS # BLD AUTO: 0.04 THOUSANDS/ΜL (ref 0–0.1)
BASOPHILS NFR BLD AUTO: 0 % (ref 0–1)
BILIRUB UR QL STRIP: NEGATIVE
BUN SERPL-MCNC: 14 MG/DL (ref 5–25)
CALCIUM SERPL-MCNC: 7.9 MG/DL (ref 8.3–10.1)
CHLORIDE SERPL-SCNC: 112 MMOL/L (ref 100–108)
CLARITY UR: ABNORMAL
CO2 SERPL-SCNC: 28 MMOL/L (ref 21–32)
COLOR UR: YELLOW
CREAT SERPL-MCNC: 0.78 MG/DL (ref 0.6–1.3)
EOSINOPHIL # BLD AUTO: 0.95 THOUSAND/ΜL (ref 0–0.61)
EOSINOPHIL NFR BLD AUTO: 7 % (ref 0–6)
ERYTHROCYTE [DISTWIDTH] IN BLOOD BY AUTOMATED COUNT: 13.6 % (ref 11.6–15.1)
GFR SERPL CREATININE-BSD FRML MDRD: 85 ML/MIN/1.73SQ M
GLUCOSE SERPL-MCNC: 114 MG/DL (ref 65–140)
GLUCOSE UR STRIP-MCNC: NEGATIVE MG/DL
HCT VFR BLD AUTO: 30.1 % (ref 36.5–49.3)
HGB BLD-MCNC: 9.1 G/DL (ref 12–17)
HGB UR QL STRIP.AUTO: ABNORMAL
INR PPP: 1.06 (ref 0.86–1.16)
KETONES UR STRIP-MCNC: ABNORMAL MG/DL
LEUKOCYTE ESTERASE UR QL STRIP: ABNORMAL
LYMPHOCYTES # BLD AUTO: 2.35 THOUSANDS/ΜL (ref 0.6–4.47)
LYMPHOCYTES NFR BLD AUTO: 17 % (ref 14–44)
MAGNESIUM SERPL-MCNC: 2.3 MG/DL (ref 1.6–2.6)
MCH RBC QN AUTO: 27.7 PG (ref 26.8–34.3)
MCHC RBC AUTO-ENTMCNC: 30.2 G/DL (ref 31.4–37.4)
MCV RBC AUTO: 92 FL (ref 82–98)
MONOCYTES # BLD AUTO: 0.94 THOUSAND/ΜL (ref 0.17–1.22)
MONOCYTES NFR BLD AUTO: 7 % (ref 4–12)
NEUTROPHILS # BLD AUTO: 9.21 THOUSANDS/ΜL (ref 1.85–7.62)
NEUTS SEG NFR BLD AUTO: 67 % (ref 43–75)
NITRITE UR QL STRIP: POSITIVE
NON-SQ EPI CELLS URNS QL MICRO: ABNORMAL /HPF
NRBC BLD AUTO-RTO: 0 /100 WBCS
PH UR STRIP.AUTO: 6 [PH] (ref 4.5–8)
PHOSPHATE SERPL-MCNC: 2.5 MG/DL (ref 2.3–4.1)
PLATELET # BLD AUTO: 261 THOUSANDS/UL (ref 149–390)
PMV BLD AUTO: 11.5 FL (ref 8.9–12.7)
POTASSIUM SERPL-SCNC: 3.5 MMOL/L (ref 3.5–5.3)
PROT UR STRIP-MCNC: ABNORMAL MG/DL
PROTHROMBIN TIME: 14.1 SECONDS (ref 12.1–14.4)
RBC # BLD AUTO: 3.29 MILLION/UL (ref 3.88–5.62)
RBC #/AREA URNS AUTO: ABNORMAL /HPF
SODIUM SERPL-SCNC: 147 MMOL/L (ref 136–145)
SP GR UR STRIP.AUTO: 1.02 (ref 1–1.03)
UROBILINOGEN UR QL STRIP.AUTO: 2 E.U./DL
VANCOMYCIN TROUGH SERPL-MCNC: 12.8 UG/ML (ref 10–20)
WBC # BLD AUTO: 13.73 THOUSAND/UL (ref 4.31–10.16)
WBC #/AREA URNS AUTO: ABNORMAL /HPF
WBC CLUMPS # UR AUTO: PRESENT /UL

## 2017-12-19 PROCEDURE — 85025 COMPLETE CBC W/AUTO DIFF WBC: CPT | Performed by: PHYSICIAN ASSISTANT

## 2017-12-19 PROCEDURE — 83735 ASSAY OF MAGNESIUM: CPT | Performed by: PHYSICIAN ASSISTANT

## 2017-12-19 PROCEDURE — C1769 GUIDE WIRE: HCPCS

## 2017-12-19 PROCEDURE — 81001 URINALYSIS AUTO W/SCOPE: CPT | Performed by: ANESTHESIOLOGY

## 2017-12-19 PROCEDURE — 84100 ASSAY OF PHOSPHORUS: CPT | Performed by: PHYSICIAN ASSISTANT

## 2017-12-19 PROCEDURE — 0T25X0Z CHANGE DRAINAGE DEVICE IN KIDNEY, EXTERNAL APPROACH: ICD-10-PCS | Performed by: RADIOLOGY

## 2017-12-19 PROCEDURE — 74150 CT ABDOMEN W/O CONTRAST: CPT

## 2017-12-19 PROCEDURE — 0T2BX0Z CHANGE DRAINAGE DEVICE IN BLADDER, EXTERNAL APPROACH: ICD-10-PCS | Performed by: INTERNAL MEDICINE

## 2017-12-19 PROCEDURE — 80048 BASIC METABOLIC PNL TOTAL CA: CPT | Performed by: PHYSICIAN ASSISTANT

## 2017-12-19 PROCEDURE — 50435 EXCHANGE NEPHROSTOMY CATH: CPT

## 2017-12-19 PROCEDURE — C1729 CATH, DRAINAGE: HCPCS

## 2017-12-19 PROCEDURE — 80202 ASSAY OF VANCOMYCIN: CPT | Performed by: NURSE PRACTITIONER

## 2017-12-19 PROCEDURE — BT121ZZ FLUOROSCOPY OF LEFT KIDNEY USING LOW OSMOLAR CONTRAST: ICD-10-PCS | Performed by: RADIOLOGY

## 2017-12-19 PROCEDURE — 87086 URINE CULTURE/COLONY COUNT: CPT | Performed by: ANESTHESIOLOGY

## 2017-12-19 PROCEDURE — 85610 PROTHROMBIN TIME: CPT | Performed by: ANESTHESIOLOGY

## 2017-12-19 RX ORDER — ATORVASTATIN CALCIUM 40 MG/1
40 TABLET, FILM COATED ORAL
Status: DISCONTINUED | OUTPATIENT
Start: 2017-12-19 | End: 2017-12-22 | Stop reason: HOSPADM

## 2017-12-19 RX ORDER — FUROSEMIDE 40 MG/1
40 TABLET ORAL DAILY
Status: DISCONTINUED | OUTPATIENT
Start: 2017-12-19 | End: 2017-12-22 | Stop reason: HOSPADM

## 2017-12-19 RX ORDER — CITALOPRAM 20 MG/1
10 TABLET ORAL
Status: DISCONTINUED | OUTPATIENT
Start: 2017-12-19 | End: 2017-12-22 | Stop reason: HOSPADM

## 2017-12-19 RX ORDER — DRONABINOL 2.5 MG/1
5 CAPSULE ORAL
Status: DISCONTINUED | OUTPATIENT
Start: 2017-12-19 | End: 2017-12-22 | Stop reason: HOSPADM

## 2017-12-19 RX ORDER — ALBUMIN, HUMAN INJ 5% 5 %
SOLUTION INTRAVENOUS
Status: COMPLETED
Start: 2017-12-19 | End: 2017-12-19

## 2017-12-19 RX ORDER — VANCOMYCIN HYDROCHLORIDE 1 G/200ML
1000 INJECTION, SOLUTION INTRAVENOUS EVERY 12 HOURS
Status: DISCONTINUED | OUTPATIENT
Start: 2017-12-19 | End: 2017-12-20

## 2017-12-19 RX ORDER — LIDOCAINE HYDROCHLORIDE 10 MG/ML
INJECTION, SOLUTION INFILTRATION; PERINEURAL CODE/TRAUMA/SEDATION MEDICATION
Status: COMPLETED | OUTPATIENT
Start: 2017-12-19 | End: 2017-12-19

## 2017-12-19 RX ORDER — MAGNESIUM SULFATE HEPTAHYDRATE 40 MG/ML
2 INJECTION, SOLUTION INTRAVENOUS ONCE
Status: COMPLETED | OUTPATIENT
Start: 2017-12-19 | End: 2017-12-20

## 2017-12-19 RX ORDER — ALBUMIN, HUMAN INJ 5% 5 %
12.5 SOLUTION INTRAVENOUS ONCE
Status: COMPLETED | OUTPATIENT
Start: 2017-12-19 | End: 2017-12-20

## 2017-12-19 RX ORDER — MIRTAZAPINE 15 MG/1
7.5 TABLET, FILM COATED ORAL
Status: DISCONTINUED | OUTPATIENT
Start: 2017-12-19 | End: 2017-12-22 | Stop reason: HOSPADM

## 2017-12-19 RX ORDER — TAMSULOSIN HYDROCHLORIDE 0.4 MG/1
0.4 CAPSULE ORAL
Status: DISCONTINUED | OUTPATIENT
Start: 2017-12-19 | End: 2017-12-22 | Stop reason: HOSPADM

## 2017-12-19 RX ORDER — POTASSIUM CHLORIDE 20 MEQ/1
40 TABLET, EXTENDED RELEASE ORAL ONCE
Status: COMPLETED | OUTPATIENT
Start: 2017-12-19 | End: 2017-12-19

## 2017-12-19 RX ADMIN — FUROSEMIDE 40 MG: 40 TABLET ORAL at 12:49

## 2017-12-19 RX ADMIN — LIDOCAINE HYDROCHLORIDE 10 ML: 10 INJECTION, SOLUTION INFILTRATION; PERINEURAL at 15:35

## 2017-12-19 RX ADMIN — ALBUMIN HUMAN 12.5 G: 0.05 INJECTION, SOLUTION INTRAVENOUS at 22:21

## 2017-12-19 RX ADMIN — SODIUM CHLORIDE, SODIUM LACTATE, POTASSIUM CHLORIDE, AND CALCIUM CHLORIDE 125 ML/HR: .6; .31; .03; .02 INJECTION, SOLUTION INTRAVENOUS at 03:00

## 2017-12-19 RX ADMIN — MEROPENEM 1000 MG: 1 INJECTION, POWDER, FOR SOLUTION INTRAVENOUS at 07:57

## 2017-12-19 RX ADMIN — ALBUMIN, HUMAN INJ 5% 12.5 G: 5 SOLUTION at 22:21

## 2017-12-19 RX ADMIN — VANCOMYCIN HYDROCHLORIDE 750 MG: 750 INJECTION, SOLUTION INTRAVENOUS at 10:17

## 2017-12-19 RX ADMIN — CHLORHEXIDINE GLUCONATE 15 ML: 1.2 RINSE ORAL at 21:09

## 2017-12-19 RX ADMIN — MIRTAZAPINE 7.5 MG: 15 TABLET, FILM COATED ORAL at 21:08

## 2017-12-19 RX ADMIN — MAGNESIUM SULFATE HEPTAHYDRATE 2 G: 40 INJECTION, SOLUTION INTRAVENOUS at 22:23

## 2017-12-19 RX ADMIN — SODIUM CHLORIDE, SODIUM LACTATE, POTASSIUM CHLORIDE, AND CALCIUM CHLORIDE 500 ML: .6; .31; .03; .02 INJECTION, SOLUTION INTRAVENOUS at 17:11

## 2017-12-19 RX ADMIN — ATORVASTATIN CALCIUM 40 MG: 40 TABLET, FILM COATED ORAL at 16:42

## 2017-12-19 RX ADMIN — DRONABINOL 5 MG: 2.5 CAPSULE ORAL at 16:42

## 2017-12-19 RX ADMIN — TAMSULOSIN HYDROCHLORIDE 0.4 MG: 0.4 CAPSULE ORAL at 16:42

## 2017-12-19 RX ADMIN — CITALOPRAM HYDROBROMIDE 10 MG: 20 TABLET ORAL at 21:09

## 2017-12-19 RX ADMIN — SODIUM CHLORIDE, SODIUM LACTATE, POTASSIUM CHLORIDE, AND CALCIUM CHLORIDE 125 ML/HR: .6; .31; .03; .02 INJECTION, SOLUTION INTRAVENOUS at 14:50

## 2017-12-19 RX ADMIN — POTASSIUM CHLORIDE 40 MEQ: 1500 TABLET, EXTENDED RELEASE ORAL at 09:32

## 2017-12-19 RX ADMIN — FAMOTIDINE 20 MG: 10 INJECTION, SOLUTION INTRAVENOUS at 08:01

## 2017-12-19 RX ADMIN — DRONABINOL 5 MG: 2.5 CAPSULE ORAL at 12:50

## 2017-12-19 RX ADMIN — HEPARIN SODIUM 5000 UNITS: 5000 INJECTION, SOLUTION INTRAVENOUS; SUBCUTANEOUS at 21:09

## 2017-12-19 RX ADMIN — MEROPENEM 1000 MG: 1 INJECTION, POWDER, FOR SOLUTION INTRAVENOUS at 16:42

## 2017-12-19 RX ADMIN — METOPROLOL TARTRATE 12.5 MG: 25 TABLET ORAL at 12:49

## 2017-12-19 RX ADMIN — CHLORHEXIDINE GLUCONATE 15 ML: 1.2 RINSE ORAL at 08:01

## 2017-12-19 RX ADMIN — MEROPENEM 1000 MG: 1 INJECTION, POWDER, FOR SOLUTION INTRAVENOUS at 02:03

## 2017-12-19 RX ADMIN — HEPARIN SODIUM 5000 UNITS: 5000 INJECTION, SOLUTION INTRAVENOUS; SUBCUTANEOUS at 14:46

## 2017-12-19 RX ADMIN — VANCOMYCIN HYDROCHLORIDE 750 MG: 750 INJECTION, SOLUTION INTRAVENOUS at 00:59

## 2017-12-19 RX ADMIN — IOHEXOL 20 ML: 300 INJECTION, SOLUTION INTRAVENOUS at 16:09

## 2017-12-19 RX ADMIN — MEROPENEM 1000 MG: 1 INJECTION, POWDER, FOR SOLUTION INTRAVENOUS at 23:30

## 2017-12-19 RX ADMIN — FAMOTIDINE 20 MG: 10 INJECTION, SOLUTION INTRAVENOUS at 21:09

## 2017-12-19 RX ADMIN — VANCOMYCIN HYDROCHLORIDE 1000 MG: 1 INJECTION, SOLUTION INTRAVENOUS at 22:10

## 2017-12-19 RX ADMIN — HEPARIN SODIUM 5000 UNITS: 5000 INJECTION, SOLUTION INTRAVENOUS; SUBCUTANEOUS at 05:25

## 2017-12-19 NOTE — SEDATION DOCUMENTATION
Dr Vincent Izaguirre spoke to [address] [facility] ICU doctor in regards to pts left kidney and PCN tube

## 2017-12-19 NOTE — PROGRESS NOTES
Progress Note - Eleno Epps 1935, 81 y o  male MRN: [id number]    Unit/Bed#:  Encounter: [id number]    Primary Care Provider: Geronimo Angel DO   Date and time admitted to hospital: 12/17/2017  8:53 PM        No new Assessment & Plan notes have been filed under this hospital service since the last note was generated  Service: Critical Care/ICU    Neuro:   no acute issues noted, continue to monitor     CV:   continue Cardizem and Lopressor for atrial fibrillation  Maintain maps equal to or greater than 65     Pulm:   encourage incentive spirometry, deep breathing, out of bed to chair     GI:   no current issues     :   chronic left-sided nephrostomy tube, placement confirmed via CT scan, urine still within 2  Urology consulted awaiting consult  Chronic Wayne in place, CT identified to be within the prostatic urethra, fully advanced at that time  Patient has a history of ESBL UTIs     F/E/N:   continue to monitor and replete electrolytes as indicated, diet as tolerated     ID:   continue meropenem     Heme:  Continue monitor hemoglobin and platelets, currently stable     Endo:   maintain glycemic control between 140-180 mg/dL                Msk/Skin:  Reposition patient q 2 hours while in bed       Disposition:  Remain in this unit            Chief Complaint:  Urosepsis    HPI/24hr events:  Patient admitted yesterday for probable urosepsis which does occur chronically for him, also, possible displaced nephrostomy tube, and deviations from normal mental status       Vitals:   Vitals:    12/18/17 1500 12/18/17 1900 12/18/17 2309 12/19/17 0239   BP: 95/54 108/59 107/55 100/53   Pulse: 70 76 80 78   Resp: 20 22 (!) 29 12   Temp: (!) 97 °F (36 1 °C) 97 5 °F (36 4 °C) 97 8 °F (36 6 °C) 98 4 °F (36 9 °C)   TempSrc: Axillary Oral Oral Oral   SpO2: 97% 100% 96% 95%   Weight:       Height:         Temp  Min: 97 °F (36 1 °C)  Max: 100 1 °F (37 8 °C)  IBW: 68 4 kg    SpO2: SpO2: 95 %      Intake/Output Summary (Last 24 hours) at 12/19/17 0248  Last data filed at 12/18/17 1827   Gross per 24 hour   Intake             1825 ml   Output             1250 ml   Net              575 ml       Invasive/non-invasive ventilation settings:   Respiratory    Lab Data (Last 4 hours)    None         O2/Vent Data (Last 4 hours)    None                Invasive Devices     Peripherally Inserted Central Catheter Line            PICC Line 03/62/52 Right Basilic 386 days          Peripheral Intravenous Line            Peripheral IV 12/17/17 Right Antecubital 2 days    Peripheral IV 12/17/17 Left Antecubital 1 day          Drain            Nephrostomy Left 257 days    Urethral Catheter 16 Fr  255 days                Active medications:    Current Facility-Administered Medications:     bisacodyl (DULCOLAX) rectal suppository 10 mg, 10 mg, Rectal, Daily PRN    chlorhexidine (PERIDEX) 0 12 % oral rinse 15 mL, 15 mL, Swish & Spit, Q12H [facility], 15 mL at 12/18/17 2131    famotidine (PEPCID) injection 20 mg, 20 mg, Intravenous, Q12H [facility], 20 mg at 12/18/17 2131    heparin (porcine) subcutaneous injection 5,000 Units, 5,000 Units, Subcutaneous, Q8H [facility], 5,000 Units at 12/18/17 2131 **AND** Platelet count, , , Once    lactated ringers infusion, 125 mL/hr, Intravenous, Continuous, 125 mL/hr at 12/18/17 0516    meropenem (MERREM) 1,000 mg in sodium chloride 0 9 % 100 mL IVPB, 1,000 mg, Intravenous, Q8H, 1,000 mg at 12/19/17 0203    vancomycin (VANCOCIN) IVPB (premix) 750 mg, 10 mg/kg, Intravenous, Q12H, 750 mg at 12/19/17 0059    Hemodynamic Monitoring  N/A    Physical Exam:  General Appearance: chronically ill    Skin: Abnormal findings: erythema - torso    Skin Breakdown and Location: yes, sacrum , present on admission    H/ENT: Normocephalic, without obvious abnormality, atraumatic ENT exam normal, no neck nodes or sinus tenderness    Eyes: negative, conjunctivae/corneas clear  PERRL, EOM's intact  Fundi benign      Cardiac: irregularly irregular rhythm and S1, S2 normal    Pulmonary: clear to auscultation bilaterally    Gastrointestinal: soft, non-tender; bowel sounds normal; no masses,  no organomegaly    Extremities: normal strength, tone, and muscle mass    Musculoskeletal: negative    Neuro: JANETTE, reflexes normal and symmetric and dysphasic speech noted which is chronic      : normal          }    Lymph: no lymphadenopathy     Lab:   I have personally reviewed pertinent lab results  , CBC: No results found for: WBC, HGB, HCT, MCV, PLT, ADJUSTEDWBC, MCH, MCHC, RDW, MPV, NRBC, CMP:   Lab Results   Component Value Date     (H) 12/18/2017    K 3 5 12/18/2017     (H) 12/18/2017    CO2 31 12/18/2017    ANIONGAP 7 12/18/2017    BUN 21 12/18/2017    CREATININE 0 93 12/18/2017    GLUCOSE 149 (H) 12/18/2017    CALCIUM 8 2 (L) 12/18/2017    EGFR 77 12/18/2017   , ABG: No results found for: PHART, FRH3HAY, PO2ART, VDE0OLY, A8BKXGXA, BEART, SOURCE, PT/INR: No results found for: PT, INR, Magnesium: No components found for: MAG, Phosphorous: No results found for: PHOS  Imaging: I have personally reviewed pertinent reports      EKG: a-fib  VTE Pharmacologic Prophylaxis: Heparin  VTE Mechanical Prophylaxis: sequential compression device  ID: Microbiology urine  Pain: No pain  Code Status: Level 1 - Full Code

## 2017-12-19 NOTE — BRIEF OP NOTE (RAD/CATH)
Left nephrostomy tube check and exchange Procedure Note    PATIENT NAME: Josephine Sykes  : 1935  MRN: [id number]     Pre-op Diagnosis:   1  Oral phase dysphagia    2  Septic shock ([address] )    3  A-fib ([address] )    4  Dehydration    5  Urinary tract infection associated with indwelling urethral catheter ([address] )    6  Urinary tract infection associated with nephrostomy catheter, initial encounter ([facility] )      Post-op Diagnosis:   1  Oral phase dysphagia    2  Septic shock ([address] )    3  A-fib ([address] )    4  Dehydration    5  Urinary tract infection associated with indwelling urethral catheter ([address] )    6  Urinary tract infection associated with nephrostomy catheter, initial encounter [facility])        Surgeon:   Alonso Metcalf MD  Assistants:     No qualified resident was available, Resident is only observing    Estimated Blood Loss: none  Findings:     Indwelling tube retracted  Small injection demonstrated filling of large structure seemingly next to the renal pelvis, may be perforation/ aneurysmal dilatation of the pelvis  There is also filling/connection to the pelvis and the calyces  After discussion with primary team, replacement of 14 Irish APD into the dilated pelvis or peripelvic structure was performed  There was emptying of the contrast in the calyces and return of clear urine on aspiration  We will take the patient to CT for confirmation of positioning  Of note nursing in ICU reports that nursing home " shoved the tube back in" when it fell out  Unclear but unlikely that imaging was used for this replacement  Given that urine is draining from the tube in its current position, and that the contrast emptied completely from the calyces, the drain does appear to be functioning in its current position       Specimens: none    Complications:  none    Anesthesia: Den Hammond MD     Date: 2017  Time: 3:59 PM

## 2017-12-19 NOTE — CONSULTS
CONSULT    Patient Name: Edwin Collier  Patient MRN: [id number]  Date of Service: 12/19/2017   Date / Time Note Created: 12/19/2017 7:41 AM   Referring Provider: Dr Deric Duran  Provider Creating Note: BILLIE Hernandes    PCP: Bowen Huitron  Attending Provider:  Demond Pack MD    Reason for Consult:    sepsis    HPI--Mr Jeannene Holter is a very frail 50-year-old male nursing home  resident admitted for sepsis of presumed  etiology with complex  history related to prostate cancer  Primary urologic provider is unknown nor prior treatment modalities  However, extensive review of electronic medical record did reveal possible urologic management occurring outside of [address] [place] [address],[address] and likely at [facility]  Patient has obvious obstruction with the left percutaneous nephrostomy for management; as well as chronic indwelling urinary catheter (last exchange indeterminate)  Patient was admitted to the ICU with acute encephalopathy and tachypnea  Patient is receiving vancomycin and meropenem secondary to history of ESBL producing organism  Per reports left percutaneous nephrostomy drainage has been sluggish and has  migrated  Urologic consultation is requested against patient's significant in complex  background  Of note, patient is a poor historian and unable to furnished other pertinent  history  This is patient's 2nd admission to the ICU this year at [facility] for  sepsis    Source:the chart         Active Problems--   Patient Active Problem List   Diagnosis    Sepsis due to urinary tract infection ([address] )    Urinary tract infection associated with indwelling urethral catheter (HCC)    HTN (hypertension)    DM (diabetes mellitus) ([address] )    Decubitus ulcer of left buttock, stage 2    Decubitus ulcer of left heel, unstageable ([address] )    Dysphagia    Bacteremia due to Gram-negative bacteria    Urinary tract infection associated with nephrostomy catheter (HCC)    Hypophosphatemia    Hypokalemia    Fever    Increased lactic acid level    Protein-calorie malnutrition, severe (HCC)    Hypernatremia     Impressions  History of prostate cancer with obstruction who status post left percutaneous nephrostomy placement  Chronic urinary catheter status  Sepsis of presumed  etiology    Recommendations  Exchange urinary catheter  Patient is a nursing home resident  Therefore, catheter exchanges are routinely every 4-6 weeks by nursing staff  Do not anticipate patient requires exchanges by urologist   Recommend 18 French coude catheter  Will consult IR to arrange for a left percutaneous nephrostomy exchange or re placement as needed  Continue sterilization  Await all culture reports and narrow per sensitivities  Consult Infectious Disease, if positive bacteremia  or for complicated antimicrobial selection  After drains are exchanged  Do not remove; For chronic use  Patient to follow-up with primary urologist as needed  Past Medical History:   Diagnosis Date    Asthma     [facility]     Cardiac disease     Decubitus ulcer     Diabetes mellitus ([address] )     Dysphagia     HTN (hypertension)     Prostate CA [facility]     PVD (peripheral vascular disease) (HCC)     Uropathy, obstructive     UTI (urinary tract infection)        Past Surgical History:   Procedure Laterality Date    NEPHROSTOMY W/ INTRODUCTION OF CATHETER Left        History reviewed  No pertinent family history  Social History     Social History    Marital status:      Spouse name: N/A    Number of children: N/A    Years of education: N/A     Occupational History    Not on file       Social History Main Topics    Smoking status: Never Smoker    Smokeless tobacco: Not on file    Alcohol use No    Drug use: No    Sexual activity: Not on file     Other Topics Concern    Not on file     Social History Narrative    No narrative on file       No Known Allergies    Review of Systems  10 point review of systems negative except as noted in HPI  Review of Systems - History obtained from unobtainable from patient due to mental status       Chart Review   Allergies, current medications, history, problem list    Vital Signs  /53   Pulse 78   Temp 98 4 °F (36 9 °C) (Oral)   Resp 12   Ht 5' 8" (1 727 m)   Wt 75 5 kg (166 lb 7 2 oz)   SpO2 95%   BMI 25 31 kg/m²     Physical Exam  General appearance: alert, appears older than stated age, cooperative, distracted, fatigued, no distress, pale and slowed mentation  Head: Normocephalic, without obvious abnormality, atraumatic  Neck: no adenopathy, no carotid bruit, no JVD, supple, symmetrical, trachea midline and thyroid not enlarged, symmetric, no tenderness/mass/nodules  Lungs: clear to auscultation bilaterally  Heart: regular rate and rhythm, S1, S2 normal, no murmur, click, rub or gallop  Abdomen: soft, non-tender; bowel sounds normal; no masses,  no organomegaly  Extremities: Somewhat contracted  Pulses: 2+ and symmetric  Neurologic: Mental status: alertness: lethargic, orientation: person  25 Swedish Wayne in place with cloudy yellow urine  Left percutaneous nephrostomy of some cloudy yellow drainage     Laboratory Studies  Lab Results   Component Value Date     (H) 12/18/2017    K 3 5 12/18/2017     (H) 12/18/2017    CO2 31 12/18/2017    GLUCOSE 149 (H) 12/18/2017    CREATININE 0 93 12/18/2017    BUN 21 12/18/2017    MG 2 4 12/18/2017    PHOS 2 6 12/18/2017     Lab Results   Component Value Date    WBC 21 17 (H) 12/18/2017    RBC 3 87 (L) 12/18/2017    HGB 10 8 (L) 12/18/2017    HCT 35 1 (L) 12/18/2017    MCV 91 12/18/2017    MCH 27 9 12/18/2017    RDW 13 6 12/18/2017     12/18/2017       Imaging and Other Studies  )  Xr Chest 2 Views    Result Date: 12/18/2017  Narrative: CHEST INDICATION:  Fever   COMPARISON:  04/11/2017 VIEWS:  Frontal and lateral projections IMAGES:  2 FINDINGS:     Cardiomediastinal silhouette appears unremarkable  No evidence of heart failure  Previously seen PICC line has been removed  The lungs are clear  No pneumothorax or pleural effusion  Visualized osseous structures appear within normal limits for the patient's age  Impression: No active pulmonary disease  Workstation performed: [id number]     Xr Chest Portable Icu    Result Date: 12/18/2017  Narrative: CHEST INDICATION:  Sepsis COMPARISON:  12/17/2017 VIEWS:   AP frontal IMAGES:  1 FINDINGS:     Heart shadow is enlarged but unchanged from prior exam  The lungs are clear  No pneumothorax or pleural effusion  Visualized osseous structures appear within normal limits for the patient's age  Impression: No active pulmonary disease  Workstation performed: [id number]     Ct Abdomen Pelvis With Contrast    Result Date: 12/18/2017  Narrative: CT ABDOMEN AND PELVIS WITH IV CONTRAST INDICATION:  Vomiting with sepsis  Erythema at the nephrostomy tube site  COMPARISON: CT chest, abdomen, pelvis 4/8/2017  TECHNIQUE:  CT examination of the abdomen and pelvis was performed  Reformatted images were created in axial, sagittal, and coronal planes  Radiation dose length product (DLP) for this visit:  711 mGy-cm   This examination, like all CT scans performed in the [facility], was performed utilizing techniques to minimize radiation dose exposure, including the use of iterative reconstruction and automated exposure control  IV Contrast:  100 mL of iohexol (OMNIPAQUE)         Enteric Contrast:  Enteric contrast was not administered  FINDINGS: ABDOMEN LOWER CHEST:  Bibasilar infiltrates noted likely atelectasis although infectious etiology not excluded  No pleural effusions  LIVER/BILIARY TREE:  Unremarkable  GALLBLADDER:  No calcified gallstones  No pericholecystic inflammatory change  SPLEEN:  Unremarkable  PANCREAS:  Unremarkable  ADRENAL GLANDS:  Unremarkable   KIDNEYS/URETERS:  Right kidney enhances normally with no hydronephrosis, solid mass, or perinephric edema  A left-sided percutaneous nephrostomy tube again noted with no fluid collection along the course of the tube to suggest abscess  There is no hydronephrosis with enhancement of the thickened wall of the renal pelvis with mild infiltration of the surrounding fat now  noted concerning for pyelitis  Tiny renal cyst is stable with no perinephric collection  The ureter is collapsed  STOMACH AND BOWEL:  Abundant fecal debris in the rectum concerning for impaction  No bowel obstruction or wall thickening  APPENDIX:  A normal appendix was visualized  ABDOMINOPELVIC CAVITY:  No ascites or free intraperitoneal air  No lymphadenopathy  VESSELS:  Atherosclerotic changes are present  No evidence of aneurysm  PELVIS REPRODUCTIVE ORGANS:  Full balloon catheter distended in the prostatic urethra  Repositioning recommended  There has been the development of enhancing multiloculated cystic lesion in the right seminal vesicle  URINARY BLADDER:  Enhancing, thickened bladder wall with no pericystic inflammation  ABDOMINAL WALL/INGUINAL REGIONS:  Fat-containing left inguinal hernia again noted  OSSEOUS STRUCTURES:  Extensive spondylosis of the thoracolumbar spine noted  No destructive lytic or blastic lesions  Impression: 1  Satisfactory position of left percutaneous nephrostomy tube with no enhancing collections along the course of the tube  No  hydronephrosis with enhancement of the thickened, renal pelvis concerning for pyelitis  2   Fecal impaction in the rectum  3   Wayne balloon catheter within the prosthetic urethra for which repositioning recommended  4   Enhancing, thickened bladder wall with development of multiloculated cystic lesion in the right seminal vesicle  Underlying cystitis should be excluded  5   Bibasilar infiltrates   Workstation performed: [id number]       Medications   Scheduled Meds:  chlorhexidine 15 mL Swish & Spit Q12H [address]   famotidine 20 mg Intravenous Q12H [address]   heparin (porcine) 5,000 Units Subcutaneous Q8H [address]   meropenem 1,000 mg Intravenous Q8H   vancomycin 10 mg/kg Intravenous Q12H     Continuous Infusions:  lactated ringers 125 mL/hr Last Rate: 125 mL/hr (12/19/17 0300)     PRN Meds:   bisacodyl      Total time spent with patient 25 minutes, >50% spent counseling and/or coordination of care           BILLIE Contreras

## 2017-12-20 PROBLEM — R50.9 FEVER: Status: RESOLVED | Noted: 2017-12-17 | Resolved: 2017-12-20

## 2017-12-20 PROBLEM — R79.89 INCREASED LACTIC ACID LEVEL: Status: RESOLVED | Noted: 2017-12-18 | Resolved: 2017-12-20

## 2017-12-20 PROBLEM — T83.022A NEPHROSTOMY TUBE DISPLACED (HCC): Status: ACTIVE | Noted: 2017-12-20

## 2017-12-20 LAB
ANION GAP SERPL CALCULATED.3IONS-SCNC: 7 MMOL/L (ref 4–13)
BACTERIA UR CULT: ABNORMAL
BACTERIA UR CULT: ABNORMAL
BASOPHILS # BLD AUTO: 0.05 THOUSANDS/ΜL (ref 0–0.1)
BASOPHILS NFR BLD AUTO: 0 % (ref 0–1)
BUN SERPL-MCNC: 9 MG/DL (ref 5–25)
CALCIUM SERPL-MCNC: 8.5 MG/DL (ref 8.3–10.1)
CHLORIDE SERPL-SCNC: 112 MMOL/L (ref 100–108)
CO2 SERPL-SCNC: 26 MMOL/L (ref 21–32)
CREAT SERPL-MCNC: 0.78 MG/DL (ref 0.6–1.3)
EOSINOPHIL # BLD AUTO: 0.62 THOUSAND/ΜL (ref 0–0.61)
EOSINOPHIL NFR BLD AUTO: 4 % (ref 0–6)
ERYTHROCYTE [DISTWIDTH] IN BLOOD BY AUTOMATED COUNT: 13.5 % (ref 11.6–15.1)
GFR SERPL CREATININE-BSD FRML MDRD: 85 ML/MIN/1.73SQ M
GLUCOSE SERPL-MCNC: 115 MG/DL (ref 65–140)
HCT VFR BLD AUTO: 36.3 % (ref 36.5–49.3)
HGB BLD-MCNC: 11.3 G/DL (ref 12–17)
INR PPP: 1.05 (ref 0.86–1.16)
LYMPHOCYTES # BLD AUTO: 2.83 THOUSANDS/ΜL (ref 0.6–4.47)
LYMPHOCYTES NFR BLD AUTO: 20 % (ref 14–44)
MAGNESIUM SERPL-MCNC: 2.9 MG/DL (ref 1.6–2.6)
MCH RBC QN AUTO: 28.1 PG (ref 26.8–34.3)
MCHC RBC AUTO-ENTMCNC: 31.1 G/DL (ref 31.4–37.4)
MCV RBC AUTO: 90 FL (ref 82–98)
MONOCYTES # BLD AUTO: 0.97 THOUSAND/ΜL (ref 0.17–1.22)
MONOCYTES NFR BLD AUTO: 7 % (ref 4–12)
NEUTROPHILS # BLD AUTO: 9.34 THOUSANDS/ΜL (ref 1.85–7.62)
NEUTS SEG NFR BLD AUTO: 67 % (ref 43–75)
NRBC BLD AUTO-RTO: 0 /100 WBCS
PHOSPHATE SERPL-MCNC: 2.1 MG/DL (ref 2.3–4.1)
PLATELET # BLD AUTO: 293 THOUSANDS/UL (ref 149–390)
PMV BLD AUTO: 11.8 FL (ref 8.9–12.7)
POTASSIUM SERPL-SCNC: 3.8 MMOL/L (ref 3.5–5.3)
PROCALCITONIN: 0.21 NG/ML (ref 0–0.08)
PROTHROMBIN TIME: 13.9 SECONDS (ref 12.1–14.4)
RBC # BLD AUTO: 4.02 MILLION/UL (ref 3.88–5.62)
SODIUM SERPL-SCNC: 145 MMOL/L (ref 136–145)
WBC # BLD AUTO: 14.03 THOUSAND/UL (ref 4.31–10.16)

## 2017-12-20 PROCEDURE — 83735 ASSAY OF MAGNESIUM: CPT | Performed by: PHYSICIAN ASSISTANT

## 2017-12-20 PROCEDURE — 85025 COMPLETE CBC W/AUTO DIFF WBC: CPT | Performed by: PHYSICIAN ASSISTANT

## 2017-12-20 PROCEDURE — 0T9B70Z DRAINAGE OF BLADDER WITH DRAINAGE DEVICE, VIA NATURAL OR ARTIFICIAL OPENING: ICD-10-PCS | Performed by: INTERNAL MEDICINE

## 2017-12-20 PROCEDURE — 80048 BASIC METABOLIC PNL TOTAL CA: CPT | Performed by: PHYSICIAN ASSISTANT

## 2017-12-20 PROCEDURE — 85610 PROTHROMBIN TIME: CPT | Performed by: ANESTHESIOLOGY

## 2017-12-20 PROCEDURE — 84100 ASSAY OF PHOSPHORUS: CPT | Performed by: PHYSICIAN ASSISTANT

## 2017-12-20 RX ORDER — SPIRONOLACTONE 25 MG/1
50 TABLET ORAL DAILY
Status: DISCONTINUED | OUTPATIENT
Start: 2017-12-20 | End: 2017-12-21

## 2017-12-20 RX ADMIN — FAMOTIDINE 20 MG: 10 INJECTION, SOLUTION INTRAVENOUS at 21:56

## 2017-12-20 RX ADMIN — FAMOTIDINE 20 MG: 10 INJECTION, SOLUTION INTRAVENOUS at 08:02

## 2017-12-20 RX ADMIN — MEROPENEM 1000 MG: 1 INJECTION, POWDER, FOR SOLUTION INTRAVENOUS at 23:31

## 2017-12-20 RX ADMIN — METOPROLOL TARTRATE 12.5 MG: 25 TABLET ORAL at 08:13

## 2017-12-20 RX ADMIN — FUROSEMIDE 40 MG: 40 TABLET ORAL at 08:13

## 2017-12-20 RX ADMIN — MEROPENEM 1000 MG: 1 INJECTION, POWDER, FOR SOLUTION INTRAVENOUS at 17:02

## 2017-12-20 RX ADMIN — SODIUM CHLORIDE, SODIUM LACTATE, POTASSIUM CHLORIDE, AND CALCIUM CHLORIDE 500 ML: .6; .31; .03; .02 INJECTION, SOLUTION INTRAVENOUS at 03:45

## 2017-12-20 RX ADMIN — MEROPENEM 1000 MG: 1 INJECTION, POWDER, FOR SOLUTION INTRAVENOUS at 08:08

## 2017-12-20 RX ADMIN — HEPARIN SODIUM 5000 UNITS: 5000 INJECTION, SOLUTION INTRAVENOUS; SUBCUTANEOUS at 15:16

## 2017-12-20 RX ADMIN — DRONABINOL 5 MG: 2.5 CAPSULE ORAL at 11:36

## 2017-12-20 RX ADMIN — HEPARIN SODIUM 5000 UNITS: 5000 INJECTION, SOLUTION INTRAVENOUS; SUBCUTANEOUS at 05:42

## 2017-12-20 RX ADMIN — MIRTAZAPINE 7.5 MG: 15 TABLET, FILM COATED ORAL at 22:00

## 2017-12-20 RX ADMIN — ATORVASTATIN CALCIUM 40 MG: 40 TABLET, FILM COATED ORAL at 16:55

## 2017-12-20 RX ADMIN — SPIRONOLACTONE 50 MG: 25 TABLET, FILM COATED ORAL at 11:36

## 2017-12-20 RX ADMIN — DRONABINOL 5 MG: 2.5 CAPSULE ORAL at 16:55

## 2017-12-20 RX ADMIN — SODIUM CHLORIDE, SODIUM LACTATE, POTASSIUM CHLORIDE, AND CALCIUM CHLORIDE 125 ML/HR: .6; .31; .03; .02 INJECTION, SOLUTION INTRAVENOUS at 00:05

## 2017-12-20 RX ADMIN — CHLORHEXIDINE GLUCONATE 15 ML: 1.2 RINSE ORAL at 21:56

## 2017-12-20 RX ADMIN — SODIUM CHLORIDE, SODIUM LACTATE, POTASSIUM CHLORIDE, AND CALCIUM CHLORIDE 125 ML/HR: .6; .31; .03; .02 INJECTION, SOLUTION INTRAVENOUS at 08:21

## 2017-12-20 RX ADMIN — CITALOPRAM HYDROBROMIDE 10 MG: 20 TABLET ORAL at 22:00

## 2017-12-20 RX ADMIN — POTASSIUM PHOSPHATE, MONOBASIC AND POTASSIUM PHOSPHATE, DIBASIC 12 MMOL: 224; 236 INJECTION, SOLUTION INTRAVENOUS at 08:15

## 2017-12-20 RX ADMIN — HEPARIN SODIUM 5000 UNITS: 5000 INJECTION, SOLUTION INTRAVENOUS; SUBCUTANEOUS at 21:56

## 2017-12-20 RX ADMIN — TAMSULOSIN HYDROCHLORIDE 0.4 MG: 0.4 CAPSULE ORAL at 16:55

## 2017-12-20 RX ADMIN — CHLORHEXIDINE GLUCONATE 15 ML: 1.2 RINSE ORAL at 08:14

## 2017-12-20 NOTE — PLAN OF CARE
Problem: Nutrition/Hydration-ADULT  Goal: Nutrient/Hydration intake appropriate for improving, restoring or maintaining nutritional needs  Monitor and assess patient's nutrition/hydration status for malnutrition (ex- brittle hair, bruises, dry skin, pale skin and conjunctiva, muscle wasting, smooth red tongue, and disorientation)  Collaborate with interdisciplinary team and initiate plan and interventions as ordered  Monitor patient's weight and dietary intake as ordered or per policy  Utilize nutrition screening tool and intervene per policy  Determine patient's food preferences and provide high-protein, high-caloric foods as appropriate       INTERVENTIONS:  - Monitor oral intake, urinary output, labs, and treatment plans  - Assess nutrition and hydration status and recommend course of action  - Evaluate amount of meals eaten  - Feed patient meals  -  Allow adequate time for meals  - Recommend/ encourage appropriate diets, oral nutritional supplements, and vitamin/mineral supplements  - Order, calculate, and assess calorie counts as needed  - Assess need for intravenous fluids  - Provide specific nutrition/hydration education as appropriate  - Include patient/family/caregiver in decisions related to nutrition    Outcome: Progressing  Improvement in intake, goal for further increase in intake to meet est nutrient needs

## 2017-12-20 NOTE — PROGRESS NOTES
Progress Note - Critical Care   Bianca Gallagher 81 y o  male MRN: [id number]  Unit/Bed#:  Encounter: [id number]      Assessment and Plan  Principal Problem:    Sepsis due to urinary tract infection ([facility] )  Active Problems:    Urinary tract infection associated with indwelling urethral catheter ([facility] )    HTN (hypertension)    DM (diabetes mellitus) ([facility] )    Decubitus ulcer of left buttock, stage 2    Urinary tract infection associated with nephrostomy catheter (HCC)    Fever    Increased lactic acid level    Protein-calorie malnutrition, severe (HCC)    Hypernatremia  Resolved Problems:    * No resolved hospital problems  *      Neuro:  Encephalopathy presumably due to urosepsis  Patient's neuro status back at baseline  Continue frequent reorientation as needed  Wakefulness during daytime hours, and sleep hygiene during p m  hours  Patient did sleep overnight  CV:  History of hypertension patient was restarted on home dose metoprolol yesterday  Cardizem was held due to hypotension  Would continue to hold as patient's blood pressure continues to be borderline  Continue Lasix  Can restart spironolactone  Atrial fibrillation on metoprolol  Cardizem on hold  In normal sinus rhythm  Continue on cardio/pulm monitoring  IV access:  Peripheral IVs      Pulm:  No issues  On room air oxygen  Will start supplemental oxygen if saturations less than 92%  Encourage deep breathing/coughing/IS/PulmToileting     GI:  Dysphagia patient is on his chronic outpatient diet, level 3 dysphagia with nectar thick liquid  Continue  No further concerns of aspiration  Continue Marinol for appetite stimulation  Bowel Reg:  None indicated  Stress Ulcer Prophylaxis Pepcid       :  History of prostate cancer with obstructive uropathy urology following  Patient had Wayne catheter placed yesterday however overnight this fell out with fluid intact  It was left out    Will discuss with Urology out of proceed  Nephrostomy tube problem it appears that at nursing home, the tube was just shoved back in on a few occasions  Concern from Interventional Radiology a possible false track however on imaging it appears that contrast does filter through  Patient is still draining urine  Will discuss with Urology how to proceed  BUN creatinine appears at baseline    Accurate I and Os  Volume goal euvolemia, continue on home Lasix  Will restart home spironolactone  Wayne needed chronically      F/E/N:  Hypernatremia resolving  Continue to encourage p o  fluids  Hypokalemia will replete with K-Phos  Because patient seems to be consistently hypokalemic, will restart spironolactone  Hypophosphatemia will replete with K-Phos    Diet:  Modified consistency with LR at 125 cc/hour as IVF  Can likely decrease today  Heme:    No issues  H&H stable  DVT Prophylaxis subcutaneous heparin  SCDs in place bilaterally     ID:  UTI patient with moderate leukocytes, nitrate positive urine  Has had ESBL in the past   Continues on meropenem (D3) and vancomycin (D3)  Follow up urine cultures, deescalate as possible  Blood cultures no growth to date  Leukocytosis slight trend upwards, continue to monitor  Vancomycin trough due12/21 at 1000 and is ordered  Endo:  No history of DM  Continue to follow glucose on AM BMP          MSK/Skin:  Old Sacral DTI POA  Frequent turning/offloading of pressure points  PT/OT      Dispo: Continue inpatient care      Counseling / Coordination of Care: Total Critical Care time spent 23 minutes excluding procedures, teaching and family updates  Chief Complaint: None given  Shakes head "No" when asked in pain  HPI/24hr events:   Patient underwent procedure  Concerns that nephrostomy tube may be a false lumen  Nephrostomy tube was replaced, urine was draining from tube in current condition and contrast emptied completely so it was left in current position      Following procedure, patient did have some transient hypotension  He was given 1 L crystalloid and 250 of albumin with resolution  Patient also had Wayne catheter come out  The balloon was still intact  It was not replaced  Vitals   Vitals:    17 0000 17 0200 17 0400 17 0600   BP: (!) 95/48 (!) 85/54 106/56 105/52   Pulse: 76 76 78 76   Resp: 18 18 19    Temp: 97 9 °F (36 6 °C)  98 1 °F (36 7 °C)    TempSrc: Axillary  Axillary    SpO2: 100% 99% 99% 100%   Weight:       Height:         Temp  Min: 97 °F (36 1 °C)  Max: 100 1 °F (37 8 °C)  IBW: 68 4 kg       Temp (24hrs), Av 8 °F (36 6 °C), Min:97 1 °F (36 2 °C), Max:98 1 °F (36 7 °C)    HR:  [68-86] 76  Resp:  [12-] 17  BP: ()/(42-67) 105/52  SpO2:  [91 %-100 %] 100 %      Hemodynamic Monitoring  N/A        Invasive/non-invasive ventilation settings   Respiratory    Lab Data (Last 4 hours)    None         O2/Vent Data (Last 4 hours)    None                Invasive  Devices  Invasive Devices     Peripherally Inserted Central Catheter Line            PICC Line  Right Basilic 438 days          Peripheral Intravenous Line            Peripheral IV 17 Right Antecubital 3 days    Peripheral IV 17 Left Antecubital 2 days          Drain            External Urinary Catheter Small less than 1 day    Nephrostomy Left 1 14 Fr  less than 1 day                  Physical Exam:  Physical Exam   Constitutional: He appears well-developed and well-nourished  He is sleeping  He is easily aroused  Non-toxic appearance  He appears ill (Chronically)  No distress  HENT:   Head: Normocephalic and atraumatic  Mouth/Throat: Mucous membranes are dry  Eyes: Conjunctivae are normal  Pupils are equal, round, and reactive to light  Neck: Trachea normal  No JVD present  No tracheal deviation present  Cardiovascular: Normal rate, regular rhythm, S1 normal, S2 normal and normal pulses  Exam reveals no gallop      No murmur heard   Pulmonary/Chest: Effort normal  No tachypnea  He has decreased breath sounds in the right lower field and the left lower field  He has no wheezes  He has no rhonchi  He has no rales  Abdominal: Soft  He exhibits no distension  There is no tenderness  Left nephrostomy tube inplace draining clean, yellow urine without erythema or drainage of site   Genitourinary:   Genitourinary Comments: Deferred   Musculoskeletal: He exhibits deformity (Foot drop in bilaterally lower extremities)  He exhibits no edema  Neurological: He is alert and easily aroused  No cranial nerve deficit or sensory deficit  GCS eye subscore is 3  GCS verbal subscore is 1  GCS motor subscore is 6  Patient does not answer orientation questions  He nods yes or no appropriately  Upper extremity bilaterally 5/5 strength  Lower extremity 4- out of 5 strength bilaterally   Skin: Skin is warm  Capillary refill takes less than 2 seconds  Old sacral DTI POA   Nursing note and vitals reviewed      Laboratory and Diagnostics:    Results from last 7 days  Lab Units 12/20/17  0454 12/19/17  0756 12/18/17  0240   WBC Thousand/uL 14 03* 13 73* 21 17*   HEMOGLOBIN g/dL 11 3* 9 1* 10 8*   HEMATOCRIT % 36 3* 30 1* 35 1*   PLATELETS Thousands/uL 293 261 351   NEUTROS PCT % 67 67 79*   MONOS PCT % 7 7 4       Results from last 7 days  Lab Units 12/20/17  0454 12/19/17  0756 12/18/17  1426 12/18/17  0240 12/17/17  2139   SODIUM mmol/L 145 147* 149* 150* 150*   POTASSIUM mmol/L 3 8 3 5 3 5 3 8 3 6   CHLORIDE mmol/L 112* 112* 111* 111* 110*   CO2 mmol/L 26 28 31 32 28   BUN mg/dL 9 14 21 27* 36*   CREATININE mg/dL 0 78 0 78 0 93 0 94 1 22   CALCIUM mg/dL 8 5 7 9* 8 2* 8 6 9 3   TOTAL PROTEIN g/dL  --   --   --  6 5 7 5   BILIRUBIN TOTAL mg/dL  --   --   --  0 20 0 30   ALK PHOS U/L  --   --   --  53 64   ALT U/L  --   --   --  22 26   AST U/L  --   --   --  15 12   GLUCOSE RANDOM mg/dL 115 114 149* 141* 238*       Results from last 7 days  Lab Units 12/20/17  0454 12/19/17  0756 12/18/17  1426   MAGNESIUM mg/dL 2 9* 2 3 2 4     Lab Results   Component Value Date    PHOS 2 1 (L) 12/20/2017    PHOS 2 5 12/19/2017    PHOS 2 6 12/18/2017        Results from last 7 days  Lab Units 12/20/17  0454 12/19/17  0527 12/18/17  0240   INR  1 05 1 06 1 07   PTT seconds  --   --  26     No components found for: ABG  No components found for: TROPONIN  Lab Results   Component Value Date    LACTICACID 1 9 12/18/2017     ABG:No results found for: PHART, ERJ1XEL, PO2ART, MCU1DYF, Q8MCRXOW, BEART, SOURCE    Micro:  Blood Culture:   Lab Results   Component Value Date    BLOODCX No Growth at 24 hrs  12/17/2017    BLOODCX No Growth at 24 hrs  12/17/2017    BLOODCX No Growth After 5 Days  04/06/2017    BLOODCX No Growth After 5 Days  04/06/2017    BLOODCX No Growth After 5 Days  04/05/2017    BLOODCX Escherichia coli ESBL (A) 04/05/2017     Urine Culture:   Lab Results   Component Value Date    URINECX No Growth <1000 cfu/mL 04/08/2017    URINECX >100,000 cfu/ml Mixed Contaminants X4 04/05/2017    URINECX  04/05/2017     50,000-59,000 cfu/ml Gram Negative Kris resembling Escherichia coli    URINECX >100,000 cfu/ml Mixed Contaminants X4 04/05/2017    URINECX 50,000-59,000 cfu/ml Escherichia coli ESBL (A) 04/05/2017     Sputum Culture: No components found for: SPUTUMCX  Wound Culure: No results found for: WOUNDCULT    Imaging:   I have personally reviewed pertinent films in PACS    I/O   I/O       12/18 0701 - 12/19 0700 12/19 0701 - 12/20 0700    P  O   540    I V  (mL/kg) 2375 (31 5) 1000 (13 2)    IV Piggyback 450 2750    Total Intake(mL/kg) 2825 (37 4) 4290 (56 8)    Urine (mL/kg/hr) 1700 (0 9) 3775 (2 1)    Stool 0 (0) 0 (0)    Total Output 1700 3775    Net +1125 +515          Unmeasured Stool Occurrence 1 x 2 x          Intake/Output Summary (Last 24 hours) at 12/20/17 0719  Last data filed at 12/20/17 0601   Gross per 24 hour   Intake             4290 ml   Output 3775 ml   Net              515 ml       Active medications  Scheduled Meds:    atorvastatin 40 mg Oral Daily With Dinner   chlorhexidine 15 mL Swish & Spit Q12H [address]   citalopram 10 mg Oral HS   dronabinol 5 mg Oral BID before lunch/dinner   famotidine 20 mg Intravenous Q12H [address]   furosemide 40 mg Oral Daily   heparin (porcine) 5,000 Units Subcutaneous Q8H [address]   meropenem 1,000 mg Intravenous Q8H   metoprolol tartrate 12 5 mg Oral Daily   mirtazapine 7 5 mg Oral HS   potassium phosphate 12 mmol Intravenous Once   sodium chloride 500 mL Intravenous Once   tamsulosin 0 4 mg Oral Daily With Dinner   vancomycin 1,000 mg Intravenous Q12H     Continuous Infusions:    lactated ringers 125 mL/hr Last Rate: 125 mL/hr (12/20/17 0005)     PRN Meds:   bisacodyl    Diet       Diet Orders            Start     Ordered    12/18/17 1436  Diet Dysphagia/Modified Consistency; Dysphagia 3-Dental Soft; Nectar Thick Liquid  Diet effective now     Question Answer Comment   Diet Type Dysphagia/Modified Consistency    Dysphagia/Modified Consistency Dysphagia 3-Dental Soft    Liquid Modifier Nectar Thick Liquid    RD to adjust diet per protocol? Yes        12/18/17 1435            VTE Pharmacologic Prophylaxis: Heparin  VTE Mechanical Prophylaxis: sequential compression device      Code Status: Level 1 - Full Code  Advance Directive and Living Will:      Power of :    POLST:      Portions of the record may have been created with voice recognition software  Occasional wrong word or "sound a like" substitutions may have occurred due to the inherent limitations of voice recognition software  Read the chart carefully and recognize, using context, where substitutions have occurred      Jose Younger PA-C

## 2017-12-20 NOTE — SOCIAL WORK
Cm spoke w pts daughter Asha Chester  Pt has been at [address] [place] [address] for 4 years  They are happy with the care and plan is for pt to transfer back there at d c pt will travel via [facility]  Cm to follow   Referral sent to [place] to update them on medical condition

## 2017-12-20 NOTE — PROGRESS NOTES
Progress Note -Surgery PA  Kateryna Green 81 y o  male MRN: [id number]  Unit/Bed#:  Encounter: [id number]      Assessment/Plan   History of prostate cancer with obstruction, status post left percutaneous nephrostomy placement - report is that nephrostomy to was showed back in at the nursing home on a few occasions when it fell out  Interventional Radiology is concerned with a possible false tract however there is no obstructive occasion of contrast on imaging  Nephrostomy tube is working and draining urine  Chronic indwelling urinary catheter status - urinary catheter was exchanged yesterday to 18 Fr, but the catheter came out overnight  Nursing states that patient came with a larger urinary catheter which was also noticed to be malpositioned on CT scan  20Fr coude was placed today with 8 cc and bulb  Sepsis of presumed  etiology - urine cultures positive for ESBL E coli, urine cultures repeated on 12/19/2017 and pending    Plan:  - percutaneous nephrostomy to is patent and producing urine  Leave in place at this time  - maintain urinary catheter  - consult IR for suprapubic tube  - continue with IV antibiotics for sterilization, narrow per sensitivities when available  ______________________________________________________________________    Subjective:   Patient had urinary catheter exchange yesterday  Urinary catheter came out overnight  It had been an 25 [place]  [de-identified] [place] coude was placed and 8 cc feeling placed in bowl  Urine return was seen immediately  Objective:    Vitals:  /59   Pulse 71   Temp 97 7 °F (36 5 °C) (Axillary)   Resp 20   Ht 5' 8" (1 727 m)   Wt 75 5 kg (166 lb 7 2 oz)   SpO2 98%   BMI 25 31 kg/m²     I/Os:  I/O last 3 completed shifts: In: 4977 [P O :540; I V :1750; IV Piggyback:3000]  Out: 6287 [Urine:4475]    I/O this shift:   In: 897 9 [IV Piggyback:897 9]  Out: 1860 [Urine:1860]    Invasive Devices     Peripherally Inserted Central Catheter Line            PICC Line 38/15/04 Right Basilic 165 days          Peripheral Intravenous Line            Peripheral IV 12/17/17 Right Antecubital 3 days    Peripheral IV 12/17/17 Left Antecubital 2 days          Drain            External Urinary Catheter Small less than 1 day    Nephrostomy Left 1 14 Fr  less than 1 day                Medications:  Current Facility-Administered Medications   Medication Dose Route Frequency    atorvastatin (LIPITOR) tablet 40 mg  40 mg Oral Daily With Dinner    bisacodyl (DULCOLAX) rectal suppository 10 mg  10 mg Rectal Daily PRN    chlorhexidine (PERIDEX) 0 12 % oral rinse 15 mL  15 mL Swish & Spit Q12H [address]    citalopram (CeleXA) tablet 10 mg  10 mg Oral HS    dronabinol (MARINOL) capsule 5 mg  5 mg Oral BID before lunch/dinner    famotidine (PEPCID) injection 20 mg  20 mg Intravenous Q12H [address]    furosemide (LASIX) tablet 40 mg  40 mg Oral Daily    heparin (porcine) subcutaneous injection 5,000 Units  5,000 Units Subcutaneous Q8H [address]    meropenem (MERREM) 1,000 mg in sodium chloride 0 9 % 100 mL IVPB  1,000 mg Intravenous Q8H    metoprolol tartrate (LOPRESSOR) partial tablet 12 5 mg  12 5 mg Oral Daily    mirtazapine (REMERON) tablet 7 5 mg  7 5 mg Oral HS    sodium chloride 0 9 % bolus 500 mL  500 mL Intravenous Once    spironolactone (ALDACTONE) tablet 50 mg  50 mg Oral Daily    tamsulosin (FLOMAX) capsule 0 4 mg  0 4 mg Oral Daily With Dinner                 Lab Results and Cultures:   CBC with diff:   Lab Results   Component Value Date    WBC 14 03 (H) 12/20/2017    HGB 11 3 (L) 12/20/2017    HCT 36 3 (L) 12/20/2017    MCV 90 12/20/2017     12/20/2017    MCH 28 1 12/20/2017    MCHC 31 1 (L) 12/20/2017    RDW 13 5 12/20/2017    MPV 11 8 12/20/2017    NRBC 0 12/20/2017       BMP/CMP:  Lab Results   Component Value Date     12/20/2017    K 3 8 12/20/2017     (H) 12/20/2017    CO2 26 12/20/2017    ANIONGAP 7 12/20/2017    BUN 9 12/20/2017 CREATININE 0 78 12/20/2017    GLUCOSE 115 12/20/2017    CALCIUM 8 5 12/20/2017    AST 15 12/18/2017    ALT 22 12/18/2017    ALKPHOS 53 12/18/2017    PROT 6 5 12/18/2017    ALBUMIN 1 8 (L) 12/18/2017    BILITOT 0 20 12/18/2017    EGFR 85 12/20/2017       Lipid Panel:   No results found for: CHOL    Coags:   Lab Results   Component Value Date    PTT 26 12/18/2017    INR 1 05 12/20/2017        Urinalysis:   Lab Results   Component Value Date    COLORU Yellow 12/19/2017    CLARITYU Turbid 12/19/2017    SPECGRAV 1 025 12/19/2017    PHUR 6 0 12/19/2017    LEUKOCYTESUR Moderate (A) 12/19/2017    NITRITE Positive (A) 12/19/2017    PROTEINUA 100 (2+) (A) 12/19/2017    GLUCOSEU Negative 12/19/2017    KETONESU Trace (A) 12/19/2017    BILIRUBINUR Negative 12/19/2017    BLOODU Moderate (A) 12/19/2017        Urine Culture:   Lab Results   Component Value Date    URINECX >100,000 cfu/ml Proteus mirabilis (A) 12/17/2017    URINECX >100,000 cfu/ml Escherichia coli ESBL (A) 12/17/2017   Blood Culture:   Lab Results   Component Value Date    BLOODCX No Growth at 48 hrs  12/17/2017         Physical Exam:  General Appearance:    Alert, cooperative, no distress, appears stated age, poor mentation   Lungs:     Clear to auscultation bilaterally, respirations unlabored, no wheezes    Heart:    Regular rate and rhythm, S1 and S2 normal, no murmur   Abdomen:    Normoactive BS, soft, non tender, non rigid, no masses, no palpated organomegaly  :  The percutaneous nephrostomy to the with the clear urine drainage  20 [place] citizen Wayne in place with urine drainage   Extremities:  Extremities normal, no calf tenderness, no cyanosis or edema   Pulses:   2+ and symmetric all extremities   Skin:   Skin color, texture, turgor normal, no rashes   Neurologic:   CNII-XII intact, normal strength, affect appropriate       Imaging:  Xr Chest 2 Views    Result Date: 12/18/2017  Impression: No active pulmonary disease   Workstation performed: [id number]     Ir Tube Change    Result Date: 12/20/2017  Impression: Impression: 1  Perirenal pelvis collection with communication to the pelvis and calyces, this may represent a ruptured pelvis, catheter was replaced into the collection  This did drain urine  Patient subsequently went to CT scan for confirmation of catheter position  Workstation performed: [id number]     Xr Chest Portable Icu    Result Date: 12/18/2017  Impression: No active pulmonary disease  Workstation performed: [id number]     Ct Abdomen Pelvis With Contrast    Result Date: 12/18/2017  Impression: 1  Satisfactory position of left percutaneous nephrostomy tube with no enhancing collections along the course of the tube  No  hydronephrosis with enhancement of the thickened, renal pelvis concerning for pyelitis  2   Fecal impaction in the rectum  3   Wayne balloon catheter within the prosthetic urethra for which repositioning recommended  4   Enhancing, thickened bladder wall with development of multiloculated cystic lesion in the right seminal vesicle  Underlying cystitis should be excluded  5   Bibasilar infiltrates   Workstation performed: [id number]       Pako Sanchez PA-C   12/20/2017

## 2017-12-20 NOTE — PROGRESS NOTES
Transfer Note - ICU Transfer to SD/MS tele   Veronica Murillo 81 y o  male MRN: [id number]  [address] [facility]   Unit/Bed#:  Encounter: [id number]    Code Status: Level 1 - Full Code  POA:    POLST:      Reason for ICU adm:  Urosepsis, nephrostomy tube dysfunction    Active problems:   Principal Problem:    Sepsis due to urinary tract infection ([facility] )  Active Problems:    Urinary tract infection associated with indwelling urethral catheter ([facility] )    HTN (hypertension)    DM (diabetes mellitus) ([facility] )    Decubitus ulcer of left buttock, stage 2    Dysphagia    Urinary tract infection associated with nephrostomy catheter ([facility] )    Hypokalemia    Protein-calorie malnutrition, severe ([facility] )    Hypernatremia    Nephrostomy tube displaced ([facility] )  Resolved Problems:    Fever    Increased lactic acid level      Consultants:  Urology, interventional radiology    History of Present Illness:   Veronica Murillo is an 81year old male with a past medical history of frequent UTIs with ESBL isolates, obstructive uropathy with chronic Wayne catheter and left nephrostomy tube, peripheral vascular disease, prostate CA, hypertension, dysphagia, type 2 diabetes, and paroxysmal atrial fibrillation who presented from [facility] with a reddened/inflamed nephrostomy site that was reported to be containing air as well as fever with T-max of 100° and decreased mental status  The emergency department he was found to have a leukocytosis of 17 6, lactic acid of 4 5, sodium of 150, BUN of 36, and creatinine of 1 22 without baseline CKD  Emergency department, patient was given 2 L of normal saline, started on vancomycin and transferred to the intensive care unit for further evaluation  Summary of clinical course:    Patient is brought from the emergency department  He was started initially on D5W for hypernatremia which was quickly transitioned to LR once sodium began to drop precipitously    He was started on meropenem due to history of ESBL as well vancomycin  Urology saw the patient  They suggested replacement of Wayne catheter as well as IR interrogation as well as replacement of perc nephrostomy tube  It sounds as though, patient has had issues with nephrostomy tube sliding in and out, and the nursing home at times would push it back in "  Wayne catheter was replaced without event yesterday  Patient went to Interventional Radiology  There was concern that nephrostomy tube was malpositioned  There is concern that there was possibly a false tract that nephrostomy through likely from being forced back in place  The CT scan revealed that all contrast did leave the renal pelvis and tube was draining appropriately so it was left after replacement  Following procedure, patient did have some transient hypotension that was resolved with fluid administration  Due to this, all home medicines were restarted with the exception of Cardizem  Remain the only home medicine that was not restarted  Overnight, patient had Wayne catheter become dislodged with balloon still intact  Urology saw the patient today and replaced  They are recommending a suprapubic catheter placement by Interventional Radiology  The consult was placed and urology PA and spoke to the interventional Radiology PA  Otherwise, patient has improved  His lactic acidosis cleared quickly with fluid administration  Mental status is at baseline  Hemodynamics have been stable since the postoperative period  Urine cultures are again returning ESBL E coli so he continues on meropenem, however vancomycin was discontinued due to no concern for gram-positive infection      Recent or scheduled procedures:   12/19 Wayne replacement by a ICU nursing  12/19 IR perc nephrostomy tube replacement and interrogation  12/20 Wayne reinsertion by Urology    TBD: Suprapubic cath placement    Outstanding/pending diagnostics:   None    Cultures:   Urine culture greater than 100,000 colonies of Proteus as well as Merary sugar coli ESBL susceptible to meropenem    Blood culture no growth to date       Mobilization Plan:  Patient is wheelchair-bound at baseline  Nutrition Plan:  Dysphagia 3 modified consistency with nectar thick liquids as per outpatient order    Discharge Plan:   Patient should be ready for discharge to [facility] ([facility]) in next 48-72 hours after IV antibiotics as well as suprapubic catheter placement    Initial Physical Therapy Recommendations:  Pending  Initial Occupational Therapy Recommendations:  Pending  Initial /Plan:  May return to [facility] ([facility])     Specific Diagnosis Plan:  Encephalopathy now back to baseline  Was likely due to urosepsis from UTI  Continue to monitor  History of hypertension at home patient is on Lasix, spironolactone, metoprolol, and Cardizem  All were restarted with the exception of Cardizem due to some transient hypotension  Can restart if blood pressures are not an issue overnight  Atrial fibrillation patient's history of paroxysmal AFib  Rate is controlled  Can restart Cardizem oral if rate becomes an issue  Dysphagia patient continues on dysphagia diet  No new concerns for aspiration  History of prostate cancer with obstructive uropathy with chronic Wayne and left nephrostomy tube status post Wayne change x2 and nephrostomy tube replacement and interrogation by IR  Urology continued to follow  They are recommending suprapubic catheter placement  IR has been consulted  Hypernatremia improving   UTI cultures growing Proteus and ESBL E coli  Continue on meropenem  Leukocytosis decreased overall  Slight interval increase after IR procedure yesterday which was expected  Continue to monitor daily CBC      Spoke with Dr El Babin  regarding transfer  Please call [phone] with any questions or concerns  Portions of the record may have been created with voice recognition software    Occasional wrong word or "sound a like" substitutions may have occurred due to the inherent limitations of voice recognition software  Read the chart carefully and recognize, using context, where substitutions have occurred      Raoul Alejandro PA-C

## 2017-12-21 PROBLEM — I48.91 ATRIAL FIBRILLATION (HCC): Status: ACTIVE | Noted: 2017-12-21

## 2017-12-21 LAB
ANION GAP SERPL CALCULATED.3IONS-SCNC: 6 MMOL/L (ref 4–13)
BACTERIA UR CULT: NORMAL
BASOPHILS # BLD AUTO: 0.05 THOUSANDS/ΜL (ref 0–0.1)
BASOPHILS NFR BLD AUTO: 0 % (ref 0–1)
BUN SERPL-MCNC: 9 MG/DL (ref 5–25)
CALCIUM SERPL-MCNC: 8.6 MG/DL (ref 8.3–10.1)
CHLORIDE SERPL-SCNC: 108 MMOL/L (ref 100–108)
CO2 SERPL-SCNC: 25 MMOL/L (ref 21–32)
CREAT SERPL-MCNC: 0.67 MG/DL (ref 0.6–1.3)
EOSINOPHIL # BLD AUTO: 0.62 THOUSAND/ΜL (ref 0–0.61)
EOSINOPHIL NFR BLD AUTO: 4 % (ref 0–6)
ERYTHROCYTE [DISTWIDTH] IN BLOOD BY AUTOMATED COUNT: 13.7 % (ref 11.6–15.1)
GFR SERPL CREATININE-BSD FRML MDRD: 90 ML/MIN/1.73SQ M
GLUCOSE SERPL-MCNC: 114 MG/DL (ref 65–140)
HCT VFR BLD AUTO: 36.4 % (ref 36.5–49.3)
HGB BLD-MCNC: 11.2 G/DL (ref 12–17)
LYMPHOCYTES # BLD AUTO: 2.86 THOUSANDS/ΜL (ref 0.6–4.47)
LYMPHOCYTES NFR BLD AUTO: 20 % (ref 14–44)
MAGNESIUM SERPL-MCNC: 2.6 MG/DL (ref 1.6–2.6)
MCH RBC QN AUTO: 27.6 PG (ref 26.8–34.3)
MCHC RBC AUTO-ENTMCNC: 30.8 G/DL (ref 31.4–37.4)
MCV RBC AUTO: 90 FL (ref 82–98)
MONOCYTES # BLD AUTO: 0.98 THOUSAND/ΜL (ref 0.17–1.22)
MONOCYTES NFR BLD AUTO: 7 % (ref 4–12)
NEUTROPHILS # BLD AUTO: 9.98 THOUSANDS/ΜL (ref 1.85–7.62)
NEUTS SEG NFR BLD AUTO: 68 % (ref 43–75)
NRBC BLD AUTO-RTO: 0 /100 WBCS
PHOSPHATE SERPL-MCNC: 2.5 MG/DL (ref 2.3–4.1)
PLATELET # BLD AUTO: 304 THOUSANDS/UL (ref 149–390)
PMV BLD AUTO: 11.5 FL (ref 8.9–12.7)
POTASSIUM SERPL-SCNC: 5.1 MMOL/L (ref 3.5–5.3)
RBC # BLD AUTO: 4.06 MILLION/UL (ref 3.88–5.62)
SODIUM SERPL-SCNC: 139 MMOL/L (ref 136–145)
WBC # BLD AUTO: 14.7 THOUSAND/UL (ref 4.31–10.16)

## 2017-12-21 PROCEDURE — 85025 COMPLETE CBC W/AUTO DIFF WBC: CPT | Performed by: PHYSICIAN ASSISTANT

## 2017-12-21 PROCEDURE — 84100 ASSAY OF PHOSPHORUS: CPT | Performed by: PHYSICIAN ASSISTANT

## 2017-12-21 PROCEDURE — 80048 BASIC METABOLIC PNL TOTAL CA: CPT | Performed by: PHYSICIAN ASSISTANT

## 2017-12-21 PROCEDURE — 83735 ASSAY OF MAGNESIUM: CPT | Performed by: PHYSICIAN ASSISTANT

## 2017-12-21 RX ORDER — SENNOSIDES 8.6 MG
1 TABLET ORAL
Status: DISCONTINUED | OUTPATIENT
Start: 2017-12-21 | End: 2017-12-22 | Stop reason: HOSPADM

## 2017-12-21 RX ORDER — SPIRONOLACTONE 25 MG/1
25 TABLET ORAL DAILY
Status: DISCONTINUED | OUTPATIENT
Start: 2017-12-21 | End: 2017-12-22 | Stop reason: HOSPADM

## 2017-12-21 RX ORDER — DOCUSATE SODIUM 100 MG/1
100 CAPSULE, LIQUID FILLED ORAL 2 TIMES DAILY
Qty: 10 CAPSULE | Refills: 0 | Status: CANCELLED | OUTPATIENT
Start: 2017-12-21

## 2017-12-21 RX ORDER — SODIUM CHLORIDE, SODIUM LACTATE, POTASSIUM CHLORIDE, CALCIUM CHLORIDE 600; 310; 30; 20 MG/100ML; MG/100ML; MG/100ML; MG/100ML
100 INJECTION, SOLUTION INTRAVENOUS CONTINUOUS
Status: DISCONTINUED | OUTPATIENT
Start: 2017-12-21 | End: 2017-12-22

## 2017-12-21 RX ORDER — DOCUSATE SODIUM 100 MG/1
100 CAPSULE, LIQUID FILLED ORAL 2 TIMES DAILY
Status: DISCONTINUED | OUTPATIENT
Start: 2017-12-21 | End: 2017-12-22

## 2017-12-21 RX ADMIN — SODIUM CHLORIDE, SODIUM LACTATE, POTASSIUM CHLORIDE, AND CALCIUM CHLORIDE 100 ML/HR: .6; .31; .03; .02 INJECTION, SOLUTION INTRAVENOUS at 21:06

## 2017-12-21 RX ADMIN — SODIUM CHLORIDE, SODIUM LACTATE, POTASSIUM CHLORIDE, AND CALCIUM CHLORIDE 100 ML/HR: .6; .31; .03; .02 INJECTION, SOLUTION INTRAVENOUS at 21:07

## 2017-12-21 RX ADMIN — MEROPENEM 1000 MG: 1 INJECTION, POWDER, FOR SOLUTION INTRAVENOUS at 09:16

## 2017-12-21 RX ADMIN — MIRTAZAPINE 7.5 MG: 15 TABLET, FILM COATED ORAL at 21:01

## 2017-12-21 RX ADMIN — FUROSEMIDE 40 MG: 40 TABLET ORAL at 09:08

## 2017-12-21 RX ADMIN — HEPARIN SODIUM 5000 UNITS: 5000 INJECTION, SOLUTION INTRAVENOUS; SUBCUTANEOUS at 15:00

## 2017-12-21 RX ADMIN — ATORVASTATIN CALCIUM 40 MG: 40 TABLET, FILM COATED ORAL at 16:55

## 2017-12-21 RX ADMIN — DOCUSATE SODIUM 100 MG: 100 CAPSULE, LIQUID FILLED ORAL at 16:55

## 2017-12-21 RX ADMIN — HEPARIN SODIUM 5000 UNITS: 5000 INJECTION, SOLUTION INTRAVENOUS; SUBCUTANEOUS at 21:01

## 2017-12-21 RX ADMIN — CHLORHEXIDINE GLUCONATE 15 ML: 1.2 RINSE ORAL at 20:56

## 2017-12-21 RX ADMIN — METOPROLOL TARTRATE 12.5 MG: 25 TABLET ORAL at 09:08

## 2017-12-21 RX ADMIN — FAMOTIDINE 20 MG: 10 INJECTION, SOLUTION INTRAVENOUS at 20:56

## 2017-12-21 RX ADMIN — CITALOPRAM HYDROBROMIDE 10 MG: 20 TABLET ORAL at 21:02

## 2017-12-21 RX ADMIN — FAMOTIDINE 20 MG: 10 INJECTION, SOLUTION INTRAVENOUS at 09:08

## 2017-12-21 RX ADMIN — HEPARIN SODIUM 5000 UNITS: 5000 INJECTION, SOLUTION INTRAVENOUS; SUBCUTANEOUS at 05:40

## 2017-12-21 RX ADMIN — DRONABINOL 5 MG: 2.5 CAPSULE ORAL at 11:22

## 2017-12-21 RX ADMIN — DRONABINOL 5 MG: 2.5 CAPSULE ORAL at 16:55

## 2017-12-21 RX ADMIN — CHLORHEXIDINE GLUCONATE 15 ML: 1.2 RINSE ORAL at 09:08

## 2017-12-21 RX ADMIN — SODIUM CHLORIDE, SODIUM LACTATE, POTASSIUM CHLORIDE, AND CALCIUM CHLORIDE 100 ML/HR: .6; .31; .03; .02 INJECTION, SOLUTION INTRAVENOUS at 07:00

## 2017-12-21 RX ADMIN — MEROPENEM 1000 MG: 1 INJECTION, POWDER, FOR SOLUTION INTRAVENOUS at 16:55

## 2017-12-21 RX ADMIN — DOCUSATE SODIUM 100 MG: 100 CAPSULE, LIQUID FILLED ORAL at 09:08

## 2017-12-21 RX ADMIN — SPIRONOLACTONE 25 MG: 25 TABLET, FILM COATED ORAL at 09:08

## 2017-12-21 RX ADMIN — SENNOSIDES 8.6 MG: 8.6 TABLET, FILM COATED ORAL at 21:01

## 2017-12-21 RX ADMIN — TAMSULOSIN HYDROCHLORIDE 0.4 MG: 0.4 CAPSULE ORAL at 16:55

## 2017-12-21 NOTE — PROGRESS NOTES
Progress Note - Sonja King 1935, 81 y o  male MRN: [id number]    Unit/Bed#:  Encounter: [id number]    Primary Care Provider: Mary Woodall DO   Date and time admitted to hospital: 12/17/2017  8:53 PM        Urinary tract infection associated with indwelling urethral catheter [facility])   Assessment & Plan    Patient with moderate leukocytes and nitrite positive urine  Urine culture growing Proteus and ESBL  Continue meropenem now D4  Vancomycin discontinued yesterday  Follow-up cultures after replacement of Wayne catheter pending  Urology following patient  They suggest placement of suprapubic catheter  Patient's daughter updated on this yesterday  Currently NPO for possible IR procedure today  Nephrostomy tube displaced [facility])   Assessment & Plan    It appears that nursing home, the tube had been sliding in an out and being readvanced by nursing home staff  IR with in interrogation as well as replacement  There was a concern for a false tract however tube draining appropriately  CT with contrast showed appropriate drainage  Hypernatremia   Assessment & Plan    Resolved with fluids  Atrial fibrillation [facility])   Assessment & Plan    Patient has paroxysmal AFib but is always rate controlled  Will continue home Lopressor but not yet ready to re-initiate Cardizem due to blood pressure  Protein-calorie malnutrition, severe (HCC)   Assessment & Plan    Continue Marinol appetite stimulation        Hypokalemia   Assessment & Plan    Restarted patient's home spironolactone  Potassium increased significantly  Will half spironolactone at this time  Dysphagia   Assessment & Plan    Continue on modified consistency diet for outpatient records        Decubitus ulcer of left buttock, stage 2   Assessment & Plan    Present on admission  Frequent turning offloading  HTN (hypertension)   Assessment & Plan    Patient with low normal blood pressures    His on his home metoprolol  Cardizem still on hold  Continues on home Lasix and spironolactone  Spironolactone dose decreased due to significant increase in potassium however still within normal limits          VTE Pharmacologic Prophylaxis:   Pharmacologic: Heparin  Mechanical VTE Prophylaxis in Place: Yes    Patient Centered Rounds: I have performed bedside rounds with nursing staff today  Discussions with Specialists or Other Care Team Provider:  Will discuss with Urology in IR    Education and Discussions with Family / Patient: Will update patient's daughter the plan of care    Time Spent for Care: 30 minutes  More than 50% of total time spent on counseling and coordination of care as described above  Current Length of Stay: 4 day(s)    Current Patient Status: Inpatient   Certification Statement: The patient will continue to require additional inpatient hospital stay due to IV antibiotics  Suprapubic catheter placement    Discharge Plan:  Likely will be discharged back to [facility] Status: Level 1 - Full Code      Subjective:   Patient had Wayne catheter replaced yesterday  Urology suggested a suprapubic catheter placed  NPO currently for IR procedure  Objective:     Vitals:   Temp (24hrs), Av 7 °F (36 5 °C), Min:97 2 °F (36 2 °C), Max:98 1 °F (36 7 °C)    HR:  [65-99] 78  Resp:  [16-22] 20  BP: ()/(52-68) 97/52  SpO2:  [96 %-99 %] 96 %  Body mass index is 25 31 kg/m²  Input and Output Summary (last 24 hours): Intake/Output Summary (Last 24 hours) at 17 0650  Last data filed at 17 0550   Gross per 24 hour   Intake           897 92 ml   Output             4085 ml   Net         -3187 08 ml       Physical Exam:     Physical Exam   Constitutional: He appears well-developed  He is sleeping  He is easily aroused  Non-toxic appearance  HENT:   Head: Normocephalic and atraumatic     Mouth/Throat: Uvula is midline, oropharynx is clear and moist and mucous membranes are normal    Eyes: Conjunctivae and EOM are normal  Pupils are equal, round, and reactive to light  Neck: Trachea normal  Neck supple  No JVD present  Cardiovascular: Normal rate, regular rhythm and normal pulses  PMI is not displaced  Pulmonary/Chest: Effort normal  No accessory muscle usage  No tachypnea  He has decreased breath sounds in the right lower field and the left lower field  Abdominal: Soft  He exhibits no distension  There is no tenderness  There is no CVA tenderness  Nephrostomy tube without erythema or drainage   Musculoskeletal: Normal range of motion  He exhibits no edema  Neurological: He is alert and easily aroused  No cranial nerve deficit or sensory deficit  GCS eye subscore is 4  GCS verbal subscore is 2  GCS motor subscore is 6  Patient nods appropriately to questions  Strength 5/5 in bilateral upper extremities  4-/5 in bilateral lower extremities   Skin: Skin is warm, dry and intact  Nursing note and vitals reviewed  Additional Data:     Labs:      Results from last 7 days  Lab Units 12/21/17  0538   WBC Thousand/uL 14 70*   HEMOGLOBIN g/dL 11 2*   HEMATOCRIT % 36 4*   PLATELETS Thousands/uL 304   NEUTROS PCT % 68   LYMPHS PCT % 20   MONOS PCT % 7   EOS PCT % 4       Results from last 7 days  Lab Units 12/21/17  0538  12/18/17  0240   SODIUM mmol/L 139  < > 150*   POTASSIUM mmol/L 5 1  < > 3 8   CHLORIDE mmol/L 108  < > 111*   CO2 mmol/L 25  < > 32   BUN mg/dL 9  < > 27*   CREATININE mg/dL 0 67  < > 0 94   CALCIUM mg/dL 8 6  < > 8 6   TOTAL PROTEIN g/dL  --   --  6 5   BILIRUBIN TOTAL mg/dL  --   --  0 20   ALK PHOS U/L  --   --  53   ALT U/L  --   --  22   AST U/L  --   --  15   GLUCOSE RANDOM mg/dL 114  < > 141*   < > = values in this interval not displayed  Results from last 7 days  Lab Units 12/20/17  0454   INR  1 05       * I Have Reviewed All Lab Data Listed Above  * Additional Pertinent Lab Tests Reviewed:  All Labs Within Last 24 Hours Reviewed    Imaging:    Imaging Reports Reviewed Today Include: CT A/P  Imaging Personally Reviewed by Myself Includes:  CXR, CT A/P    Recent Cultures (last 7 days):       Results from last 7 days  Lab Units 12/19/17  1041 12/17/17  2240 12/17/17  2237 12/17/17  2235 12/17/17  2139   BLOOD CULTURE   --   --   --  No Growth at 48 hrs  No Growth at 48 hrs  URINE CULTURE  Culture too young- will reincubate  --  >100,000 cfu/ml Proteus mirabilis*  >100,000 cfu/ml Escherichia coli ESBL*  --   --    INFLUENZA A PCR   --  None Detected  --   --   --    INFLUENZA B PCR   --  None Detected  --   --   --    RSV PCR   --  None Detected  --   --   --        Last 24 Hours Medication List:     atorvastatin 40 mg Oral Daily With Dinner   chlorhexidine 15 mL Swish & Spit Q12H [address]   citalopram 10 mg Oral HS   dronabinol 5 mg Oral BID before lunch/dinner   famotidine 20 mg Intravenous Q12H [address]   furosemide 40 mg Oral Daily   heparin (porcine) 5,000 Units Subcutaneous Q8H [address]   meropenem 1,000 mg Intravenous Q8H   metoprolol tartrate 12 5 mg Oral Daily   mirtazapine 7 5 mg Oral HS   sodium chloride 500 mL Intravenous Once   spironolactone 25 mg Oral Daily   tamsulosin 0 4 mg Oral Daily With Dinner        Today, Patient Was Seen By: Marsi Benitez PA-C    ** Please Note: Dragon 360 Dictation voice to text software may have been used in the creation of this document   **

## 2017-12-21 NOTE — SOCIAL WORK
CM met with pt at bedside  IMM reviewed with pt  Questions answered  Copy to pt and copy to MR for scanning  Pt to be discharged tomorrow via SLET to [facility] at [address] [place] [address] form completed and placed in pt's chart

## 2017-12-21 NOTE — ASSESSMENT & PLAN NOTE
Patient with moderate leukocytes and nitrite positive urine  Urine culture growing Proteus and ESBL  Continue meropenem now D4  Vancomycin discontinued yesterday  Follow-up cultures after replacement of Wayne catheter pending  Urology following patient  They suggest placement of suprapubic catheter  Patient's daughter updated on this yesterday  Currently NPO for possible IR procedure today

## 2017-12-21 NOTE — ASSESSMENT & PLAN NOTE
It appears that nursing home, the tube had been sliding in an out and being readvanced by nursing home staff  IR with in interrogation as well as replacement  There was a concern for a false tract however tube draining appropriately  CT with contrast showed appropriate drainage

## 2017-12-21 NOTE — PROGRESS NOTES
UROLOGY PROGRESS NOTE   Patient Identifiers: Tricia Agiurre (MRN [id number])  Date of Service: 12/21/2017    Subjective:     Left PCN exchanged 12/19/17 with IR  Wayne catheter replaced yesterday  Catheter had fallen out during repositioning  Another catheter was inserted in currently draining clear yellow urine  Objective:     VITALS:    Vitals:    12/21/17 0400   BP:    Pulse:    Resp:    Temp: 97 7 °F (36 5 °C)   SpO2:        INS & OUTS:  I/O last 24 hours:   In: 897 9 [IV Piggyback:897 9]  Out: 4085 [Urine:4085]    LABS:  Lab Results   Component Value Date    HGB 11 2 (L) 12/21/2017    HCT 36 4 (L) 12/21/2017    WBC 14 70 (H) 12/21/2017     12/21/2017   ]    Lab Results   Component Value Date     12/21/2017    K 5 1 12/21/2017     12/21/2017    CO2 25 12/21/2017    BUN 9 12/21/2017    CREATININE 0 67 12/21/2017    CALCIUM 8 6 12/21/2017    GLUCOSE 114 12/21/2017   ]    INPATIENT MEDS:    Current Facility-Administered Medications:     atorvastatin (LIPITOR) tablet 40 mg, 40 mg, Oral, Daily With Eri Cain PA-C, 40 mg at 12/20/17 1655    bisacodyl (DULCOLAX) rectal suppository 10 mg, 10 mg, Rectal, Daily PRN, BILLIE Mercado    chlorhexidine (PERIDEX) 0 12 % oral rinse 15 mL, 15 mL, Swish & Godwin, Q12H [facility], Heydi Arteaga MD, 15 mL at 12/20/17 2156    citalopram (CeleXA) tablet 10 mg, 10 mg, Oral, HS, Zain Brizuela PA-C, 10 mg at 12/20/17 2200    dronabinol (MARINOL) capsule 5 mg, 5 mg, Oral, BID before lunch/dinner, Zain Brizuela PA-C, 5 mg at 12/20/17 1655    famotidine (PEPCID) injection 20 mg, 20 mg, Intravenous, Q12H [facility], Heydi Arteaga MD, 20 mg at 12/20/17 2156    furosemide (LASIX) tablet 40 mg, 40 mg, Oral, Daily, Zain Brizuela PA-C, 40 mg at 12/20/17 0813    heparin (porcine) subcutaneous injection 5,000 Units, 5,000 Units, Subcutaneous, Q8H [facility], 5,000 Units at 12/21/17 0540 **AND** Platelet count, , , Once, MD Blanca Mendieta lactated ringers infusion, 100 mL/hr, Intravenous, Continuous, Elier Fine PA-C    meropenem [facility]) 1,000 mg in sodium chloride 0 9 % 100 mL IVPB, 1,000 mg, Intravenous, Q8H, Sol Coley MD, Last Rate: 100 mL/hr at 12/20/17 2331, 1,000 mg at 12/20/17 2331    metoprolol tartrate (LOPRESSOR) partial tablet 12 5 mg, 12 5 mg, Oral, Daily, AJ Bear-TABATHA, 12 5 mg at 12/20/17 0813    mirtazapine (REMERON) tablet 7 5 mg, 7 5 mg, Oral, HS, AJ Bear-TABATHA, 7 5 mg at 12/20/17 2200    sodium chloride 0 9 % bolus 500 mL, 500 mL, Intravenous, Once, BILLIE Darling    spironolactone (ALDACTONE) tablet 25 mg, 25 mg, Oral, Daily, Elier Fine PA-C    tamsulosin [facility]) capsule 0 4 mg, 0 4 mg, Oral, Daily With Harley Cantor PA-C, 0 4 mg at 12/20/17 1655      Physical Exam:     GEN: no acute distress  Non-verbal  RESP: breathing comfortably with no accessory muscle use    ABD: soft, non-tender, non-distended   EXT: no significant peripheral edema   Left PCN: clean, dry, intact  Draining clear yellow urine  YBARRA: two-way Ybarra catheter in place draining clear yellow urine  : normal phallus normal scrotum  Testicles descended bilaterally without any edema or discoloration of the scrotum  Nontender to palpation  Assessment/Plan:   History of prostate cancer with obstruction status post left percutaneous nephrostomy exchange (12/19/17)  - Question if exchange of nephrostomy tube had penetrated through the renal pelvis  On imaging PCN appears within the renal pelvis  Clear yellow urine is draining from the PCN as well  Recommendations for continued observation at this time  Chronic urinary retention  - Catheter had fallen out yesterday and was reinserted  Currently catheter is in place draining clear yellow urine without any abnormalities  - Question whether suprapubic tube would be beneficial for the patient   Since his Ybarra catheter is currently in place draining urine without hematuria I would recommend continued Wayne catheter through the urethra  I would defer suprapubic tube placement in a non-acute setting to his primary urologist  In the meantime, larger amounts of sterile water can be instilled into the balloon in order to decrease the risk of catheter coming out  Sepsis with presumed  etiology  - urine culture growing Proteus and ESBL  Continue current antibiotics  No further urologic workup required at this time  Both his percutaneous nephrostomy tube and Wayne catheter are in position and draining urine appropriately  Patient will follow-up with his primary urologist for continued urologic maintenance  Please do not hesitate to contact us with any further urologic concerns  Thank you for allowing us to participate in this patients care      James Bosch PA-C

## 2017-12-21 NOTE — PROGRESS NOTES
Reviewed case with ICU team earlier today  Patient seen and examined this evening in anticipation of transfer to my service in the am   Patient awakens easily, shakes head but did not speak  Noted Catheter replaced and plan for suprapubic catheter in am   Nothing to add at this time

## 2017-12-21 NOTE — ASSESSMENT & PLAN NOTE
Restarted patient's home spironolactone  Potassium increased significantly  Will half spironolactone at this time

## 2017-12-21 NOTE — ASSESSMENT & PLAN NOTE
Patient with low normal blood pressures  His on his home metoprolol  Cardizem still on hold  Continues on home Lasix and spironolactone    Spironolactone dose decreased due to significant increase in potassium however still within normal limits

## 2017-12-21 NOTE — CASE MANAGEMENT
Continued Stay Review    Date: 12/21/17    Vital Signs: /56   Pulse 79   Temp 97 8 °F (36 6 °C) (Oral)   Resp 16   Ht 5' 8" (1 727 m)   Wt 77 2 kg (170 lb 3 1 oz)   SpO2 97%   BMI 25 88 kg/m²     Medications:   Scheduled Meds:   atorvastatin 40 mg Oral Daily With Dinner   chlorhexidine 15 mL Swish & Spit Q12H [address]   citalopram 10 mg Oral HS   docusate sodium 100 mg Oral BID   dronabinol 5 mg Oral BID before lunch/dinner   famotidine 20 mg Intravenous Q12H [address]   furosemide 40 mg Oral Daily   heparin (porcine) 5,000 Units Subcutaneous Q8H [address]   meropenem 1,000 mg Intravenous Q8H   metoprolol tartrate 12 5 mg Oral Daily   mirtazapine 7 5 mg Oral HS   senna 1 tablet Oral HS   sodium chloride 500 mL Intravenous Once   spironolactone 25 mg Oral Daily   tamsulosin 0 4 mg Oral Daily With Dinner     Continuous Infusions:   lactated ringers 100 mL/hr Last Rate: 100 mL/hr (12/21/17 0700)     PRN Meds: bisacodyl    Abnormal Labs/Diagnostic Results:    12/20   WBC 14 70   Hemoglobin 11 2   Hematocrit 36 4   MCHC 30 8     Age/Sex: 81 y o  male     Assessment/Plan:   12/21 Critical Care Progress Note  Urinary tract infection associated with indwelling urethral catheter ([address] )   Assessment & Plan     Patient with moderate leukocytes and nitrite positive urine  Urine culture growing Proteus and ESBL  Continue meropenem now D4  Vancomycin discontinued yesterday  Follow-up cultures after replacement of Wayne catheter pending      Urology following patient  They suggest placement of suprapubic catheter  Patient's daughter updated on this yesterday  Currently NPO for possible IR procedure today        Nephrostomy tube displaced [facility])   Assessment & Plan     It appears that nursing home, the tube had been sliding in an out and being readvanced by nursing home staff  IR with in interrogation as well as replacement  There was a concern for a false tract however tube draining appropriately    CT with contrast showed appropriate drainage        Hypernatremia   Assessment & Plan     Resolved with fluids        Atrial fibrillation [facility])   Assessment & Plan     Patient has paroxysmal AFib but is always rate controlled  Will continue home Lopressor but not yet ready to re-initiate Cardizem due to blood pressure        Protein-calorie malnutrition, severe (HCC)   Assessment & Plan     Continue Marinol appetite stimulation       Hypokalemia   Assessment & Plan     Restarted patient's home spironolactone  Potassium increased significantly  Will half spironolactone at this time        Dysphagia   Assessment & Plan     Continue on modified consistency diet for outpatient records       Decubitus ulcer of left buttock, stage 2   Assessment & Plan     Present on admission  Frequent turning offloading        HTN (hypertension)   Assessment & Plan     Patient with low normal blood pressures  His on his home metoprolol  Cardizem still on hold  Continues on home Lasix and spironolactone  Spironolactone dose decreased due to significant increase in potassium however still within normal limits       VTE Pharmacologic Prophylaxis:   Pharmacologic: Heparin  Mechanical VTE Prophylaxis in Place: Yes  Current Length of Stay: 4 day(s)  Current Patient Status: Inpatient   Certification Statement: The patient will continue to require additional inpatient hospital stay due to IV antibiotics  Suprapubic catheter placement  __________________________________  12/21 Urology Progress Note  History of prostate cancer with obstruction status post left percutaneous nephrostomy exchange (12/19/17)  - Question if exchange of nephrostomy tube had penetrated through the renal pelvis  On imaging PCN appears within the renal pelvis  Clear yellow urine is draining from the PCN as well  Recommendations for continued observation at this time      Chronic urinary retention  - Catheter had fallen out yesterday and was reinserted   Currently catheter is in place draining clear yellow urine without any abnormalities  - Question whether suprapubic tube would be beneficial for the patient  Since his Wayne catheter is currently in place draining urine without hematuria I would recommend continued Wayne catheter through the urethra  I would defer suprapubic tube placement in a non-acute setting to his primary urologist  In the meantime, larger amounts of sterile water can be instilled into the balloon in order to decrease the risk of catheter coming out      Sepsis with presumed  etiology  - urine culture growing Proteus and ESBL  Continue current antibiotics  No further urologic workup required at this time  Both his percutaneous nephrostomy tube and Wayne catheter are in position and draining urine appropriately  Patient will follow-up with his primary urologist for continued urologic maintenance      Discharge Plan: Return to SNF

## 2017-12-21 NOTE — PLAN OF CARE
Problem: DISCHARGE PLANNING - CARE MANAGEMENT  Goal: Discharge to post-acute care or home with appropriate resources  INTERVENTIONS:  - Conduct assessment to determine patient/family and health care team treatment goals, and need for post-acute services based on payer coverage, community resources, and patient preferences, and barriers to discharge  - Address psychosocial, clinical, and financial barriers to discharge as identified in assessment in conjunction with the patient/family and health care team  - Arrange appropriate level of post-acute services according to patient's   needs and preference and payer coverage in collaboration with the physician and health care team  - Communicate with and update the patient/family, physician, and health care team regarding progress on the discharge plan  - Arrange appropriate transportation to post-acute venues    Outcome: Progressing  CM met with pt at bedside  IMM reviewed with pt  Questions answered  Copy to pt and copy to MR for scanning  Pt to be discharged tomorrow via SLET to [facility] form completed and placed in pt's chart

## 2017-12-22 ENCOUNTER — APPOINTMENT (INPATIENT)
Dept: RADIOLOGY | Facility: HOSPITAL | Age: 82
DRG: 698 | End: 2017-12-22
Payer: MEDICARE

## 2017-12-22 ENCOUNTER — GENERIC CONVERSION - ENCOUNTER (OUTPATIENT)
Dept: OTHER | Facility: OTHER | Age: 82
End: 2017-12-22

## 2017-12-22 VITALS
WEIGHT: 167.77 LBS | RESPIRATION RATE: 22 BRPM | SYSTOLIC BLOOD PRESSURE: 96 MMHG | HEART RATE: 82 BPM | BODY MASS INDEX: 25.43 KG/M2 | OXYGEN SATURATION: 100 % | DIASTOLIC BLOOD PRESSURE: 54 MMHG | TEMPERATURE: 97.5 F | HEIGHT: 68 IN

## 2017-12-22 LAB
ANION GAP SERPL CALCULATED.3IONS-SCNC: 5 MMOL/L (ref 4–13)
BASOPHILS # BLD AUTO: 0.04 THOUSANDS/ΜL (ref 0–0.1)
BASOPHILS NFR BLD AUTO: 0 % (ref 0–1)
BUN SERPL-MCNC: 8 MG/DL (ref 5–25)
CALCIUM SERPL-MCNC: 8.3 MG/DL (ref 8.3–10.1)
CHLORIDE SERPL-SCNC: 106 MMOL/L (ref 100–108)
CO2 SERPL-SCNC: 29 MMOL/L (ref 21–32)
CREAT SERPL-MCNC: 0.81 MG/DL (ref 0.6–1.3)
EOSINOPHIL # BLD AUTO: 0.55 THOUSAND/ΜL (ref 0–0.61)
EOSINOPHIL NFR BLD AUTO: 4 % (ref 0–6)
ERYTHROCYTE [DISTWIDTH] IN BLOOD BY AUTOMATED COUNT: 13.8 % (ref 11.6–15.1)
GFR SERPL CREATININE-BSD FRML MDRD: 83 ML/MIN/1.73SQ M
GLUCOSE SERPL-MCNC: 113 MG/DL (ref 65–140)
HCT VFR BLD AUTO: 34.5 % (ref 36.5–49.3)
HGB BLD-MCNC: 10.7 G/DL (ref 12–17)
LYMPHOCYTES # BLD AUTO: 2.71 THOUSANDS/ΜL (ref 0.6–4.47)
LYMPHOCYTES NFR BLD AUTO: 21 % (ref 14–44)
MAGNESIUM SERPL-MCNC: 2.4 MG/DL (ref 1.6–2.6)
MCH RBC QN AUTO: 27.6 PG (ref 26.8–34.3)
MCHC RBC AUTO-ENTMCNC: 31 G/DL (ref 31.4–37.4)
MCV RBC AUTO: 89 FL (ref 82–98)
MONOCYTES # BLD AUTO: 0.81 THOUSAND/ΜL (ref 0.17–1.22)
MONOCYTES NFR BLD AUTO: 6 % (ref 4–12)
NEUTROPHILS # BLD AUTO: 8.35 THOUSANDS/ΜL (ref 1.85–7.62)
NEUTS SEG NFR BLD AUTO: 66 % (ref 43–75)
NRBC BLD AUTO-RTO: 0 /100 WBCS
PLATELET # BLD AUTO: 300 THOUSANDS/UL (ref 149–390)
PMV BLD AUTO: 11.5 FL (ref 8.9–12.7)
POTASSIUM SERPL-SCNC: 3.7 MMOL/L (ref 3.5–5.3)
RBC # BLD AUTO: 3.88 MILLION/UL (ref 3.88–5.62)
SODIUM SERPL-SCNC: 140 MMOL/L (ref 136–145)
WBC # BLD AUTO: 12.64 THOUSAND/UL (ref 4.31–10.16)

## 2017-12-22 PROCEDURE — 71010 HB CHEST X-RAY 1 VIEW FRONTAL: CPT

## 2017-12-22 PROCEDURE — 83735 ASSAY OF MAGNESIUM: CPT | Performed by: PHYSICIAN ASSISTANT

## 2017-12-22 PROCEDURE — 36569 INSJ PICC 5 YR+ W/O IMAGING: CPT

## 2017-12-22 PROCEDURE — 92610 EVALUATE SWALLOWING FUNCTION: CPT

## 2017-12-22 PROCEDURE — C1751 CATH, INF, PER/CENT/MIDLINE: HCPCS

## 2017-12-22 PROCEDURE — 85025 COMPLETE CBC W/AUTO DIFF WBC: CPT | Performed by: PHYSICIAN ASSISTANT

## 2017-12-22 PROCEDURE — 80048 BASIC METABOLIC PNL TOTAL CA: CPT | Performed by: PHYSICIAN ASSISTANT

## 2017-12-22 PROCEDURE — 02H633Z INSERTION OF INFUSION DEVICE INTO RIGHT ATRIUM, PERCUTANEOUS APPROACH: ICD-10-PCS | Performed by: INTERNAL MEDICINE

## 2017-12-22 RX ORDER — AMOXICILLIN AND CLAVULANATE POTASSIUM 875; 125 MG/1; MG/1
1 TABLET, FILM COATED ORAL EVERY 12 HOURS SCHEDULED
Status: DISCONTINUED | OUTPATIENT
Start: 2017-12-22 | End: 2017-12-22

## 2017-12-22 RX ORDER — DRONABINOL 5 MG/1
5 CAPSULE ORAL
Qty: 6 CAPSULE | Refills: 0 | Status: SHIPPED | OUTPATIENT
Start: 2017-12-22 | End: 2018-01-01

## 2017-12-22 RX ORDER — LACTULOSE 20 G/30ML
10 SOLUTION ORAL DAILY PRN
Qty: 300 ML | Refills: 0 | Status: SHIPPED | OUTPATIENT
Start: 2017-12-22

## 2017-12-22 RX ORDER — CITALOPRAM 10 MG/1
10 TABLET ORAL
Qty: 30 TABLET | Refills: 0 | Status: SHIPPED | OUTPATIENT
Start: 2017-12-22

## 2017-12-22 RX ORDER — BISACODYL 10 MG
10 SUPPOSITORY, RECTAL RECTAL DAILY PRN
Qty: 12 SUPPOSITORY | Refills: 0 | Status: SHIPPED | OUTPATIENT
Start: 2017-12-22

## 2017-12-22 RX ORDER — SENNOSIDES 8.6 MG
1 TABLET ORAL
Qty: 120 EACH | Refills: 0 | Status: SHIPPED | OUTPATIENT
Start: 2017-12-22

## 2017-12-22 RX ORDER — LACTULOSE 20 G/30ML
10 SOLUTION ORAL DAILY PRN
Status: DISCONTINUED | OUTPATIENT
Start: 2017-12-22 | End: 2017-12-22 | Stop reason: HOSPADM

## 2017-12-22 RX ORDER — MIRTAZAPINE 7.5 MG/1
7.5 TABLET, FILM COATED ORAL
Qty: 30 TABLET | Refills: 0 | Status: SHIPPED | OUTPATIENT
Start: 2017-12-22

## 2017-12-22 RX ORDER — POTASSIUM CHLORIDE 20 MEQ/1
20 TABLET, EXTENDED RELEASE ORAL ONCE
Status: COMPLETED | OUTPATIENT
Start: 2017-12-22 | End: 2017-12-22

## 2017-12-22 RX ADMIN — AMOXICILLIN AND CLAVULANATE POTASSIUM 1 TABLET: 875; 125 TABLET, FILM COATED ORAL at 08:50

## 2017-12-22 RX ADMIN — MEROPENEM 1000 MG: 1 INJECTION, POWDER, FOR SOLUTION INTRAVENOUS at 00:08

## 2017-12-22 RX ADMIN — FUROSEMIDE 40 MG: 40 TABLET ORAL at 08:50

## 2017-12-22 RX ADMIN — METOPROLOL TARTRATE 12.5 MG: 25 TABLET ORAL at 08:50

## 2017-12-22 RX ADMIN — DRONABINOL 5 MG: 2.5 CAPSULE ORAL at 11:10

## 2017-12-22 RX ADMIN — HEPARIN SODIUM 5000 UNITS: 5000 INJECTION, SOLUTION INTRAVENOUS; SUBCUTANEOUS at 06:19

## 2017-12-22 RX ADMIN — POTASSIUM CHLORIDE 20 MEQ: 1500 TABLET, EXTENDED RELEASE ORAL at 08:50

## 2017-12-22 RX ADMIN — CHLORHEXIDINE GLUCONATE 15 ML: 1.2 RINSE ORAL at 08:50

## 2017-12-22 RX ADMIN — SPIRONOLACTONE 25 MG: 25 TABLET, FILM COATED ORAL at 08:50

## 2017-12-22 RX ADMIN — SODIUM CHLORIDE 1000 MG: 9 INJECTION, SOLUTION INTRAVENOUS at 11:10

## 2017-12-22 RX ADMIN — FAMOTIDINE 20 MG: 10 INJECTION, SOLUTION INTRAVENOUS at 08:50

## 2017-12-22 RX ADMIN — HEPARIN SODIUM 5000 UNITS: 5000 INJECTION, SOLUTION INTRAVENOUS; SUBCUTANEOUS at 14:32

## 2017-12-22 NOTE — DISCHARGE SUMMARY
Discharge Summary - Harley Aponte 81 y o  male MRN: [id number]    Unit/Bed#:  Encounter: [id number]    Admission Date: 12/17/2017     Admitting Diagnosis: Dehydration [E86 0]  A-fib ([facility] ) [I48 91]  Fever [R50 9]  Oral phase dysphagia [R13 11]  Septic shock ([facility] [address] ) [A41 9, R65 21]    HPI:   Harley Aponte is an 81year old male with past medical history of frequent UTIs with ESBL isolates, obstructive uropathy with chronic Wayne in left nephrostomy tube, peripheral vascular disease, prostate CA, hypertension, dysphagia, type 2 diabetes, paroxysmal atrial fibrillation knew presented from [address] with a red/inflamed nephrostomy site was reported to be containing air, fever with a T-max of 100°, and decreased mental status  In the emergency department patient was found have a leukocytosis of 17 6, lactic acidosis 4 5, sodium of 150, BUN of 36, and a creatinine of 1 22 without known baseline CKD  Emergency department patient was given 2 L normal saline started on vancomycin transferred to the step-down unit for further evaluation and treatment  Procedures Performed:   12/19 Wayne catheter replacement by ICU nursing  12/19 IR perc nephrostomy tube replacement and tear gauge in  12/20 Wayne reinserted by Urology    Hospital Course: The patient was brought from the emergency room  Initially started on D5W for hypernatremia however quickly transition to LR once sodium became dropped precipitously  He was started on meropenem due to history of ESBL as well as continued on vancomycin  Urology saw the patient  They suggest replacement of Wayne catheter as well as interrogation and replacement of perc nephrostomy tube  Sounds like a chronic issue prior to admission was frequent sliding of the nephrostomy tube that was advanced by nursing home at times  Wayne catheter was replaced without event on 12/19    Patient went to interventional Radiology and there was initial concerns in nephrostomy tube was malpositioned, and possibly a false tract in kidney due to for said readvanced been of approximately 2  The tube was replaced, the CT scan revealed that all contrasted leave the renal pelvis that the tube was draining appropriately so it was left in current physician  Following the procedure patient did have some hypotension that resolved with fluid administration  He was restarted on all of his home meds with the exception of Cardizem  On the early morning of 12/20, patient's Wayne catheter became dislodged with patient turn  The balloon was still intact  Urology saw the patient replaced  A initial assessment plan was that patient should get a suprapubic catheter with IR  However after subsequent discussion, it was decided to keep the Wayne catheter  Patient's mental status improved, lactic acidosis cleared, and hemodynamics also improved the patient was transferred to the internal medicine service  Patient had urine culture that grew out ESBL E coli as well as Proteus  Per ID recs, he should complete 14 days of ertapenem, day 1 was today  He had a PICC line placed on 12/22 for this  For concern of dysphagia, speech saw patient and recommended increase to dysphagia 1, honey thick liquids  He will have an outpatient endoscopy on January 3rd  Patient will return to [facility] today, 12/22  He returned with a Wayne catheter as well as a perc nephrostomy tube  Wayne catheter should be left in place  The perc nephrostomy tube should also be left in place should not be readvanced under any circumstance  If there are any problems, nursing home should call we [facility]  Should also follow up there within a week  He also follow up with his PCP in a week  She was cleared for discharge  His daughter was updated on plan of discharge and agreed      Vitals:    12/22/17 0400 12/22/17 0600 12/22/17 0740 12/22/17 1141   BP: 118/56  106/51 96/54 Pulse: 86  76 82   Resp: 22  (!) 23 22   Temp: 97 5 °F (36 4 °C)  98 °F (36 7 °C) 97 5 °F (36 4 °C)   TempSrc: Oral  Axillary Axillary   SpO2: 99%  96% 100%   Weight:  76 1 kg (167 lb 12 3 oz)     Height:         Physical Exam    Significant Findings, Care, Treatment and Services Provided:     Xr Chest 2 Views  Result Date: 12/18/2017  Impression: No active pulmonary disease  Xr Chest Portable Icu  Result Date: 12/18/2017  Impression: No active pulmonary disease  Workstation performed: [id number]     Ct Abdomen Pelvis With Contrast  Result Date: 12/18/2017  Impression: 1  Satisfactory position of left percutaneous nephrostomy tube with no enhancing collections along the course of the tube  No  hydronephrosis with enhancement of the thickened, renal pelvis concerning for pyelitis  2   Fecal impaction in the rectum  3   Wyane balloon catheter within the prosthetic urethra for which repositioning recommended  4   Enhancing, thickened bladder wall with development of multiloculated cystic lesion in the right seminal vesicle  Underlying cystitis should be excluded  5   Bibasilar infiltrates  Ir Tube Change  Result Date: 12/20/2017  Impression: Impression: 1  Perirenal pelvis collection with communication to the pelvis and calyces, this may represent a ruptured pelvis, catheter was replaced into the collection  This did drain urine  Patient subsequently went to CT scan for confirmation of catheter position       Complications: None    Discharge Diagnosis:   Principal Problem:    Urinary tract infection associated with indwelling urethral catheter (HCC)  Active Problems:    Hypernatremia    Nephrostomy tube displaced (HCC)    HTN (hypertension)    Decubitus ulcer of left buttock, stage 2    Dysphagia    Hypokalemia    Protein-calorie malnutrition, severe (HCC)    Atrial fibrillation (HCC)    Condition at Discharge: fair     Discharge instructions/Information to patient and family:   See after visit summary for information provided to patient and family  Provisions for Follow-Up Care:  See after visit summary for information related to follow-up care and any pertinent home health orders  Disposition: Skilled nursing facility at [facility] Readmission: No    Discharge Statement   I spent 23 minutes discharging the patient  This time was spent on the day of discharge  I had direct contact with the patient on the day of discharge  Additional documentation is required if more than 30 minutes were spent on discharge  Discharge Medications:  See after visit summary for reconciled discharge medications provided to patient and family

## 2017-12-22 NOTE — PLAN OF CARE

## 2017-12-22 NOTE — PLAN OF CARE
DISCHARGE PLANNING     Discharge to home or other facility with appropriate resources Progressing        DISCHARGE PLANNING - CARE MANAGEMENT     Discharge to post-acute care or home with appropriate resources Progressing        INFECTION - ADULT     Absence or prevention of progression during hospitalization Progressing        Knowledge Deficit     Patient/family/caregiver demonstrates understanding of disease process, treatment plan, medications, and discharge instructions Progressing        Nutrition/Hydration-ADULT     Nutrient/Hydration intake appropriate for improving, restoring or maintaining nutritional needs Progressing        Potential for Falls     Patient will remain free of falls Progressing        Prexisting or High Potential for Compromised Skin Integrity     Skin integrity is maintained or improved Progressing        SAFETY ADULT     Patient will remain free of falls Progressing     Maintain or return to baseline ADL function Progressing     Maintain or return mobility status to optimal level Progressing

## 2017-12-22 NOTE — PLAN OF CARE
Problem: SLP ADULT - SWALLOWING, IMPAIRED  Goal: Initial SLP swallow eval performed  Outcome: Completed Date Met: 12/22/17

## 2017-12-22 NOTE — PROCEDURES
PICC Line Insertion  Date/Time: 12/22/2017 11:16 AM  Performed by: Mony Naqvi by: Ramona Roa     Patient location:  Bedside  Other Assisting Provider: Yes (comment)    Consent:     Consent obtained:  Written    Consent given by:  Healthcare agent  Universal protocol:     Procedure explained and questions answered to patient or proxy's satisfaction: yes      Relevant documents present and verified: yes      Test results available and properly labeled: yes      Required blood products, implants, devices, and special equipment available: yes      Site/side marked: yes      Immediately prior to procedure, a time out was called: yes      Patient identity confirmed:  Hospital-assigned identification number and arm band  Pre-procedure details:     Hand hygiene: Hand hygiene performed prior to insertion      Sterile barrier technique: All elements of maximal sterile technique followed      Skin preparation:  ChloraPrep  Indications:     PICC line indications: long term antibiotics    Anesthesia (see MAR for exact dosages): Anesthesia method:  Local infiltration    Local anesthetic:  Lidocaine 1% w/o epi  Procedure details:     Location:  Basilic    Vessel type: vein      Laterality:  Right    Approach: percutaneous technique used      Patient position:  Flat    Procedural supplies:  Double lumen    Catheter size:  5 Fr    Landmarks identified: yes      Ultrasound guidance: yes      Sterile ultrasound techniques: Sterile gel and sterile probe covers were used      Number of attempts:  1    Successful placement: yes (midline catheter)      Total catheter length (cm):  20    Catheter out on skin (cm):  0    Max flow rate:  5 ml/sec    Arm circumference:  32  Post-procedure details:     Post-procedure:  Dressing applied and securement device placed    Assessment:  Placement verified by x-ray    Post-procedure complications: none      Patient tolerance of procedure:   Tolerated well, no immediate complications    Observer: Yes      Observer name:  ICU Nurse Lilian Wilkes

## 2017-12-22 NOTE — SPEECH THERAPY NOTE
Speech Language/Pathology  Speech/Language Pathology  Assessment    Patient Name: Isaac Mccormack  Today's Date: 12/22/2017     Problem List  Patient Active Problem List   Diagnosis    Sepsis due to urinary tract infection ([facility] )    Urinary tract infection associated with indwelling urethral catheter ([facility] )    HTN (hypertension)    DM (diabetes mellitus) ([facility] )    Decubitus ulcer of left buttock, stage 2    Decubitus ulcer of left heel, unstageable ([facility] )    Dysphagia    Bacteremia due to Gram-negative bacteria    Urinary tract infection [facility] with [facility]rostomy catheter [facility]    Hypophosphatemia    Hypokalemia    Protein-calorie malnutrition, severe (HCC)    Hypernatremia    Nephrostomy tube displaced ([facility] )    Atrial fibrillation ([facility] [address] )     Past Medical History  Past Medical History:   Diagnosis Date    Asthma     Atrial fibrillation ([facility] [address] )     Cardiac disease     Decubitus ulcer     Diabetes mellitus ([facility] [facility] )     Dysphagia     HTN (hypertension)     Prostate CA ([facility] )     PVD (peripheral vascular disease) ([facility] )     Uropathy, obstructive     UTI (urinary tract infection)      Past Surgical History  Past Surgical History:   Procedure Laterality Date    NEPHROSTOMY W/ INTRODUCTION OF CATHETER Left         12/22/17 3505   Patient Information   Current Medical Pt is an 80 y/o male admitted to [facility] on 12/17/2017 with UTI  Pt was seen by this department 4/2017 where he was recommended to have a level 2 diet and honey thick liquids  Pt was since upgraded to a level 3 diet and NTL at [facility]  Request was made for swallowing evaluation today prior to discharge to assure diet tolerance  Special Studies CXR: no active disease   Social/Educational/Vocational Hx [facility]   Swallow Information   Current Risks for Dysphagia & Aspiration Cognitive deficit; Mental status change   Current Symptoms/Concerns Cough;Clear throat;HX Dysphagia   Current Diet Dysphagia advance; Nectar thick liquid   Baseline textures and NTL  No s/s of aspiration noted with puree or HTL with occasional cues for swallow initiation and rate  Pt appears most appropriate for a pureed diet and honey thick liquids at this time  If pt is discharged to facility recommend close follow up with SLP at [facility] for diet tolerance and advancement  Recommendations   Risk for Aspiration (Mild-moderate)   Recommendations Consider oral diet; Dysphagia treatment   Diet Solid Recommendation Level 1 Dysphagia/pureed   Diet Liquid Recommendation Honey thick liquid   Recommended Form of Meds Crushed; With puree; As tolerated   General Precautions Aspiration precautions; Feed only when alert;Minimize distractions;Upright as possible for all oral intake;Remain upright for 45 mins after meals; Supervision with meals;Assist with feeding   Compensatory Swallowing Strategies Alternate solids and liquids; External pacing   Results Reviewed with RN   Treatment Recommendations   Follow up treatments Strategy training;Continue clinical assessment; Assure diet tolerance; Patient/family education   Dysphagia Goals Patient will tolerate recommended diet without observed clinical signs of oral/pharngeal dysphagia; Patient will tolerate advanced diet with no signs of oral or pharngeal dysphagia   Speech Therapy Prognosis   Prognosis Fair   Prognosis Considerations Previous Level of Function;Severity of Impairments

## 2017-12-22 NOTE — SOCIAL WORK
CM met with pt at bedside  Pt to be d/dilip to [place] via SLET at 15:00  IMM already completed  Med Ignacioretta Ka form in pt's chart

## 2017-12-22 NOTE — PLAN OF CARE
Problem: DISCHARGE PLANNING - CARE MANAGEMENT  Goal: Discharge to post-acute care or home with appropriate resources  INTERVENTIONS:  - Conduct assessment to determine patient/family and health care team treatment goals, and need for post-acute services based on payer coverage, community resources, and patient preferences, and barriers to discharge  - Address psychosocial, clinical, and financial barriers to discharge as identified in assessment in conjunction with the patient/family and health care team  - Arrange appropriate level of post-acute services according to patient's   needs and preference and payer coverage in collaboration with the physician and health care team  - Communicate with and update the patient/family, physician, and health care team regarding progress on the discharge plan  - Arrange appropriate transportation to post-acute venues    Outcome: Completed Date Met: 12/22/17  CM met with pt at bedside  Pt to be d/dilip to [place] via SLET at 15:00  IMM already completed  Efe Ingram form in pt's chart

## 2017-12-22 NOTE — PLAN OF CARE
Problem: DISCHARGE PLANNING - CARE MANAGEMENT  Goal: Discharge to post-acute care or home with appropriate resources  INTERVENTIONS:  - Conduct assessment to determine patient/family and health care team treatment goals, and need for post-acute services based on payer coverage, community resources, and patient preferences, and barriers to discharge  - Address psychosocial, clinical, and financial barriers to discharge as identified in assessment in conjunction with the patient/family and health care team  - Arrange appropriate level of post-acute services according to patient's   needs and preference and payer coverage in collaboration with the physician and health care team  - Communicate with and update the patient/family, physician, and health care team regarding progress on the discharge plan  - Arrange appropriate transportation to post-acute venues    Outcome: Progressing  IMM reviewed with daughter Gabi Matthew via phone  No questions offered  Copy to pt and copy to MR for scanning

## 2017-12-22 NOTE — SOCIAL WORK
IMM reviewed with daughter Alicia Gomez via phone  No questions offered  Copy to pt and copy to MR for scanning

## 2017-12-22 NOTE — SOCIAL WORK
Patient needs PICC and IV ABX for ESBL history  Megan Show from [address] aware and can still accept patient for today  CM spoke with PICC RN and she is able to place PICC today before noon  SLETS 10am canceled and 3pm BLS with SLETS rescheduled with Lesley RECINOS notified pt's daughter  CM notified Megan Show at [address]  All aware

## 2017-12-23 LAB
BACTERIA BLD CULT: NORMAL
BACTERIA BLD CULT: NORMAL

## 2018-01-16 NOTE — PROCEDURES
Procedures by Neri Coleman RN at  4/11/2017 12:12 PM      Author:  Neri Coleman RN  Service:  Internal Medicine  Author Type:  Registered Nurse     Filed:  4/11/2017 12:41 PM  Date of Service:  4/11/2017 12:12 PM  Status:  Addendum     :  Neri Coleman RN (Registered Nurse)         Related Notes: Original Note by Neri Coleman RN (Registered Nurse) filed at 4/11/2017 12:14 PM        Cosigner:  Nate Judge MD at 4/17/2017  9:33 PM           Procedure Orders:       1  Insert PICC line [[id number]] ordered by Neri Coleman RN at 04/11/17 1212                 Post-procedure Diagnoses:       1  UTI (urinary tract infection) [N39 0]                    PICC Line Insertion  Date/Time: 4/11/2017 11:45 AM  Performed by: Jenny Abreu by: Chantelle ASTORGA     Patient location:  Bedside  Other Assisting Provider:  No    Consent:     Consent obtained:  Verbal    Consent given by:  Rocco Moreno (Daughter, verbal consent obtained via telephone per [facility])    Risks discussed:  Arterial puncture, bleeding and infection    Alternatives discussed:  No treatment and delayed treatment  Universal protocol:     Procedure explained and questions answered to patient or proxy's satisfaction: yes      Relevant documents present and verified: yes      Site/side marked: yes      Immediately prior to procedure,  a time out was called: yes      Patient identity confirmed:  Arm band, provided demographic data and hospital-assigned identification number  Pre-procedure details:     Hand hygiene: Hand hygiene performed prior to insertion      Sterile barrier technique: All elements of maximal sterile technique followed      Skin preparation:  ChloraPrep    Skin preparation agent: Skin preparation agent completely dried prior to procedure    Indications:     PICC line indications: long term antibiotics    Anesthesia (see MAR for exact dosages):      Anesthesia method:  Local infiltration    Local anesthetic:  Lidocaine 1% w/o epi (5ML)  Procedure details:     Location:  [place]ilic    Vessel type: vein      Laterality:  Right    Approach: percutaneous endoscopic technique used      Patient position:  Flat    Procedural supplies:  Double lumen    Catheter size:  5 Fr    Landmarks identified: yes      Ultrasound guidance: yes      Sterile ultrasound techniques: Sterile gel and sterile probe covers were used      Number of attempts:  1    Successful placement: yes      Vessel of catheter tip end:  Chest Xray needed to confirm placement    Total catheter length (cm):  43    Catheter out on skin (cm):  0    Max flow rate:  5ML/SEC    Arm circumference:  35  Post-procedure details:     Post-procedure:  Dressing applied and securement device placed    Assessment:  Blood return through all ports, free fluid flow and placement verified by x-ray    Post-procedure complications: none      Patient tolerance of procedure:   Tolerated well, no immediate complications                       Received for:Provider  EPIC   Apr 17 2017  9:33PM [facility] Standard Time

## 2018-01-23 NOTE — PROCEDURES
Procedures by Odilia Garcia RN  at 12/22/2017 11:16 AM      Author:  Odilia Garcia RN Service:  Interventional Radiology  Author Type:  Registered Nurse    Filed:  12/22/2017 11:23 AM Date of Service:  12/22/2017 11:16 AM Status:  Attested    :  Odilia Garcia RN (Registered Nurse)  Cosigner:  Rudy Rivers MD at 12/22/2017  2:55 PM      Procedure Orders:       1  Insert PICC line [[id number]] ordered by Odilia Garcia RN at 12/22/17 1116                 Post-procedure Diagnoses:       1  Sepsis due to urinary tract infection ([facility] [address] ) [A41 9, N39 0]              Attestation signed by Rudy Rivers MD at 12/22/2017  2:55 PM      consent obtained from daughter via phone                    PICC Line Insertion  Date/Time: 12/22/2017 11:16 AM  Performed by: Heidi Thornton by: Fawn Giraldo     Patient location:  Bedside  Other Assisting Provider:  Yes (comment)    Consent:     Consent obtained:  Written    Consent given by:  Healthcare agent  Universal protocol:     Procedure explained and questions answered to patient or proxy's satisfaction: yes      Relevant documents present and verified: yes      Test results available and properly labeled: yes       Required blood products, implants, devices, and special equipment available: yes      Site/side marked: yes      Immediately prior to procedure, a time out was called: yes      Patient identity confirmed:  Hospital-assigned identification number and arm band  Pre-procedure details:     Hand hygiene: Hand hygiene performed prior to insertion      Sterile barrier technique: All elements of maximal sterile technique followed      Skin preparation:  ChloraPrep  Indications:     PICC line indications: long term antibiotics    Anesthesia (see MAR for exact dosages):      Anesthesia method:  Local infiltration    Local anesthetic:  Lidocaine 1% w/o epi  Procedure details:     Location:  Basilic    Vessel type: vein      Laterality: Right    Approach: percutaneous technique used      Patient position:  Flat    Procedural supplies:  Double lumen    Catheter size:  5 Fr    Landmarks identified: yes      Ultrasound guidance: yes      Sterile ultrasound techniques: Sterile gel and sterile probe covers were used      Number of attempts:  1    Successful placement: yes (midline catheter)      Total catheter length (cm):  20    Catheter out on skin (cm):  0    Max flow rate:  5 ml/sec    Arm circumference:  32  Post-procedure details:     Post-procedure:  Dressing applied and securement device placed    Assessment:  Placement verified by x-ray    Post-procedure complications: none      Patient tolerance of procedure:   Tolerated well, no immediate complications    Observer: Yes      Observer name:  ICU Nurse Vikki                       Received for:Provider  EPIC   Dec 22 2017  2:55PM [facility] Standard Time